# Patient Record
Sex: FEMALE | Race: WHITE | Employment: FULL TIME | URBAN - METROPOLITAN AREA
[De-identification: names, ages, dates, MRNs, and addresses within clinical notes are randomized per-mention and may not be internally consistent; named-entity substitution may affect disease eponyms.]

---

## 2017-08-18 ENCOUNTER — OFFICE VISIT (OUTPATIENT)
Dept: SURGERY | Age: 52
End: 2017-08-18

## 2017-08-18 VITALS
WEIGHT: 190.5 LBS | HEIGHT: 60 IN | HEART RATE: 88 BPM | DIASTOLIC BLOOD PRESSURE: 74 MMHG | OXYGEN SATURATION: 95 % | SYSTOLIC BLOOD PRESSURE: 120 MMHG | RESPIRATION RATE: 20 BRPM | TEMPERATURE: 97.5 F | BODY MASS INDEX: 37.4 KG/M2

## 2017-08-18 DIAGNOSIS — E53.8 VITAMIN B12 DEFICIENCY: ICD-10-CM

## 2017-08-18 DIAGNOSIS — Z98.84 STATUS POST GASTRIC BYPASS FOR OBESITY: ICD-10-CM

## 2017-08-18 DIAGNOSIS — R63.5 WEIGHT GAIN: ICD-10-CM

## 2017-08-18 DIAGNOSIS — E51.9 VITAMIN B1 DEFICIENCY: ICD-10-CM

## 2017-08-18 DIAGNOSIS — E66.01 MORBID OBESITY DUE TO EXCESS CALORIES (HCC): Primary | ICD-10-CM

## 2017-08-18 DIAGNOSIS — E55.9 VITAMIN D DEFICIENCY: ICD-10-CM

## 2017-08-18 DIAGNOSIS — E03.9 ACQUIRED HYPOTHYROIDISM: ICD-10-CM

## 2017-08-18 DIAGNOSIS — K90.9 INTESTINAL MALABSORPTION, UNSPECIFIED TYPE: ICD-10-CM

## 2017-08-18 DIAGNOSIS — D50.9 IRON DEFICIENCY ANEMIA, UNSPECIFIED IRON DEFICIENCY ANEMIA TYPE: ICD-10-CM

## 2017-08-18 RX ORDER — MAGNESIUM 200 MG
1000 TABLET ORAL DAILY
COMMUNITY

## 2017-08-18 RX ORDER — OMEPRAZOLE 40 MG/1
40 CAPSULE, DELAYED RELEASE ORAL
COMMUNITY

## 2017-08-18 NOTE — PATIENT INSTRUCTIONS
Schedule an appointment with the dietician, please call or email   Orin Benavidez RD at:    [phone]  [email or web address]         Upper GI Series: About This Test  What is it? An upper gastrointestinal (GI) series looks at the upper and middle sections of the gastrointestinal tract. The test uses barium contrast material, fluoroscopy, and X-ray. Fluoroscopy is a kind of X-ray. Why is this test done? An upper GI series is done to:  · Find the cause of gastrointestinal symptoms, such as vomiting, burping up food, trouble swallowing, or belly pain. · Find inflamed areas of the intestine. · Find narrow spots (strictures) in the upper intestinal tract or find ulcers, tumors, polyps, or pyloric stenosis. · Find swallowed objects. How can you prepare for the test?  Tell your doctor if:  · You are taking any medicine. · You are allergic to any medicines, barium, or any other X-ray contrast material.  · You are or might be pregnant. This test is not done during pregnancy because of the risk of radiation to the baby (fetus). Your doctor may ask you to do one or all of the following:  · Eat a low-fiber diet for a few days before the test.  · Stop eating for 12 hours before the test.  · Take a laxative to help clean out your intestines the evening before the test.  · Stop taking certain medicines. What happens before the test?  The test is usually done in a clinic or the X-ray department of a hospital.  · You will need to take off your clothes and put on a hospital gown. · Take out any dentures, and take off any jewelry. What happens during the test?  · You will lie on your back on an X-ray table. · You will have an X-ray taken before you drink the barium mix. Then you'll take small swallows repeatedly during the series of X-rays that follow. · The doctor watches the barium pass through your GI tract using fluoroscopy and X-ray pictures.  The table is tilted at different positions, and you may change positions to help spread the barium. What else should you know about the test?  · You may be given a laxative or enema to flush the barium out of your intestines after the test. This prevents constipation. · It's a good idea to drink a lot of fluids for a few days to flush out the barium. · For 1 to 3 days after the test, your stool will look white from the barium. How long does the test take? · The test will take about 30 to 40 minutes. If you are also having a small bowel study, the test will take 2 to 6 hours. In some cases, you may be asked to come back after 24 hours to have more X-rays taken. What happens after the test?  · You will probably be able to go home right away. Results of the test are usually ready in 1 to 3 days. · You can go back to your usual activities right away. You may eat and drink whatever you like, unless your doctor tells you not to. When should you call for help? Watch closely for changes in your health, and be sure to contact your doctor if:  · You aren't able to have a bowel movement in 2 to 3 days after the test.  Follow-up care is a key part of your treatment and safety. Be sure to make and go to all appointments, and call your doctor if you are having problems. It's also a good idea to keep a list of the medicines you take. Ask your doctor when you can expect to have your test results. Where can you learn more? Go to http://vy-rea.info/. Enter C390 in the search box to learn more about \"Upper GI Series: About This Test.\"  Current as of: October 14, 2016  Content Version: 11.3  © 1565-9606 [employer]. Care instructions adapted under license by [employer] (which disclaims liability or warranty for this information). If you have questions about a medical condition or this instruction, always ask your healthcare professional. [address] any warranty or liability for your use of this information.

## 2017-08-18 NOTE — MR AVS SNAPSHOT
Visit Information Date & Time Provider Department Dept. Phone Encounter #  
 8/18/2017 11:20 AM Juan Beavers NP [facility] [phone] [id number] Upcoming Health Maintenance Date Due Hepatitis C Screening 1965 DTaP/Tdap/Td series (1 - Tdap) 10/17/1984 PAP AKA CERVICAL CYTOLOGY 10/17/1984 BREAST CANCER SCRN MAMMOGRAM 10/17/2013 FOBT Q 1 YEAR AGE 50-75 10/17/2013 INFLUENZA AGE 9 TO ADULT 8/1/2017 Allergies as of 8/18/2017  Review Complete On: 8/18/2017 By: Hilario Bryan LPN Severity Noted Reaction Type Reactions Aspirin  01/23/2012    Other (comments) Due to GBP surgery Other Medication  01/23/2012    Other (comments) Steroids, can't take due to Gastric Bypass surgery Current Immunizations  Never Reviewed No immunizations on file. Not reviewed this visit You Were Diagnosed With   
  
 Codes Comments Morbid obesity due to excess calories ([facility]    -  Primary ICD-10-CM: E66.01 
ICD-9-CM: 278.01 Intestinal malabsorption, unspecified type     ICD-10-CM: K90.9 ICD-9-CM: 579.9 Status post gastric bypass for obesity     ICD-10-CM: Z98.84 ICD-9-CM: V45.86 Weight gain     ICD-10-CM: R63.5 ICD-9-CM: 783.1 Vitamin B12 deficiency     ICD-10-CM: E53.8 ICD-9-CM: 266.2 Vitamin D deficiency     ICD-10-CM: E55.9 ICD-9-CM: 268.9 Iron deficiency anemia, unspecified iron deficiency anemia type     ICD-10-CM: D50.9 ICD-9-CM: 280.9 Vitamin B1 deficiency     ICD-10-CM: E51.9 ICD-9-CM: 265.1 Acquired hypothyroidism     ICD-10-CM: E03.9 ICD-9-CM: 244.9 BMI 37.0-37.9, adult     ICD-10-CM: Z68.37 ICD-9-CM: V85.37 Vitals BP Pulse Temp Resp Height(growth percentile) Weight(growth percentile) 120/74 (BP 1 Location: Left arm, BP Patient Position: At rest) 88 97.5 °F (36.4 °C) 20 5' (1.524 m) 190 lb 8 oz (86.4 kg) SpO2 BMI Smoking Status 95% 37.2 kg/m2 Former Smoker Vitals History BMI and BSA Data Body Mass Index Body Surface Area  
 37.2 kg/m 2 1.91 m 2 Preferred Pharmacy Pharmacy Name Phone [id number] [address] Your Updated Medication List  
  
   
This list is accurate as of: 8/18/17 12:23 PM.  Always use your most recent med list.  
  
  
  
  
 CALCIUM + D PO Take  by mouth.  
  
 cyanocobalamin 1,000 mcg/mL injection Commonly known as:  VITAMIN B-12  
1 mL by IntraMUSCular route every thirty (30) days. EFFEXOR  mg capsule Generic drug:  venlafaxine-SR Take  by mouth daily. esomeprazole 40 mg capsule Commonly known as:  NexIUM Take 1 Cap by mouth daily. MULTI-VITAMIN PO Take  by mouth. omeprazole 40 mg capsule Commonly known as:  PRILOSEC Take 40 mg by mouth daily. SYNTHROID 175 mcg tablet Generic drug:  levothyroxine Take 112 mcg by mouth Daily (before breakfast). Syringe with Needle, Safety 3 mL 23 gauge x 1\" Syrg Commonly known as:  3cc Safety Syringe 23Gx1\" 1 Syringe by Does Not Apply route every thirty (30) days. VITAMIN D2 PO Take  by mouth. We Performed the Following CBC W/O DIFF [93795 CPT(R)] IRON PROFILE P390276 CPT(R)] METABOLIC PANEL, COMPREHENSIVE [49674 CPT(R)] PREALBUMIN [90746 CPT(R)] PTH INTACT [16204 CPT(R)] TSH 3RD GENERATION [90490 CPT(R)] VITAMIN B1, WHOLE BLOOD [id number] CPT(R)] VITAMIN B12 & FOLATE [73896 CPT(R)] VITAMIN D, 25 HYDROXY [id number] CPT(R)] To-Do List   
 08/18/2017 Imaging:  XR UPPER GI SERIES W KUB Patient Instructions Schedule an appointment with the dietician, please call or email Yuko Monroy RD at: 
 
[phone] [email or web address] 
 
 
  
Upper GI Series: About This Test 
What is it? An upper gastrointestinal (GI) series looks at the upper and middle sections of the gastrointestinal tract. The test uses barium contrast material, fluoroscopy, and X-ray. Fluoroscopy is a kind of X-ray. Why is this test done? An upper GI series is done to: · Find the cause of gastrointestinal symptoms, such as vomiting, burping up food, trouble swallowing, or belly pain. · Find inflamed areas of the intestine. · Find narrow spots (strictures) in the upper intestinal tract or find ulcers, tumors, polyps, or pyloric stenosis. · Find swallowed objects. How can you prepare for the test? 
Tell your doctor if: 
· You are taking any medicine. · You are allergic to any medicines, barium, or any other X-ray contrast material. 
· You are or might be pregnant. This test is not done during pregnancy because of the risk of radiation to the baby (fetus). Your doctor may ask you to do one or all of the following: 
· Eat a low-fiber diet for a few days before the test. 
· Stop eating for 12 hours before the test. 
· Take a laxative to help clean out your intestines the evening before the test. 
· Stop taking certain medicines. What happens before the test? 
The test is usually done in a clinic or the X-ray department of a hospital. 
· You will need to take off your clothes and put on a hospital gown. · Take out any dentures, and take off any jewelry. What happens during the test? 
· You will lie on your back on an X-ray table. · You will have an X-ray taken before you drink the barium mix. Then you'll take small swallows repeatedly during the series of X-rays that follow. · The doctor watches the barium pass through your GI tract using fluoroscopy and X-ray pictures. The table is tilted at different positions, and you may change positions to help spread the barium.  
What else should you know about the test? 
· You may be given a laxative or enema to flush the barium out of your intestines after the test. This prevents constipation. · It's a good idea to drink a lot of fluids for a few days to flush out the barium. · For 1 to 3 days after the test, your stool will look white from the barium. How long does the test take? · The test will take about 30 to 40 minutes. If you are also having a small bowel study, the test will take 2 to 6 hours. In some cases, you may be asked to come back after 24 hours to have more X-rays taken. What happens after the test? 
· You will probably be able to go home right away. Results of the test are usually ready in 1 to 3 days. · You can go back to your usual activities right away. You may eat and drink whatever you like, unless your doctor tells you not to. When should you call for help? Watch closely for changes in your health, and be sure to contact your doctor if: 
· You aren't able to have a bowel movement in 2 to 3 days after the test. 
Follow-up care is a key part of your treatment and safety. Be sure to make and go to all appointments, and call your doctor if you are having problems. It's also a good idea to keep a list of the medicines you take. Ask your doctor when you can expect to have your test results. Where can you learn more? Go to http://vy-rea.info/. Enter J416 in the search box to learn more about \"Upper GI Series: About This Test.\" Current as of: October 14, 2016 Content Version: 11.3 © 1234-2358 [employer]. Care instructions adapted under license by [employer] (which disclaims liability or warranty for this information). If you have questions about a medical condition or this instruction, always ask your healthcare professional. [place] [address] any warranty or liability for your use of this information. Introducing [address]!    
 César Frausto introduces [employer] patient portal. Now you can access parts of your medical record, email your doctor's office, and request medication refills online. 1. In your internet browser, go to [email or web address] 2. Click on the First Time User? Click Here link in the Sign In box. You will see the New Member Sign Up page. 3. Enter your [employer]le Access Code exactly as it appears below. You will not need to use this code after youve completed the sign-up process. If you do not sign up before the expiration date, you must request a new code. · Perkle Access Code: [email or web address] Expires: 11/16/2017 12:23 PM 
 
4. Enter the last four digits of your Social Security Number (xxxx) and Date of Birth (mm/dd/yyyy) as indicated and click Submit. You will be taken to the next sign-up page. 5. Create a Perkle ID. This will be your Perkle login ID and cannot be changed, so think of one that is secure and easy to remember. 6. Create a [employer]kle password. You can change your password at any time. 7. Enter your Password Reset Question and Answer. This can be used at a later time if you forget your password. 8. Enter your e-mail address. You will receive e-mail notification when new information is available in [address] 9. Click Sign Up. You can now view and download portions of your medical record. 10. Click the Download Summary menu link to download a portable copy of your medical information. If you have questions, please visit the Frequently Asked Questions section of the [employer] website. Remember, [employer] is NOT to be used for urgent needs. For medical emergencies, dial 911. Now available from your iPhone and Android! Please provide this summary of care documentation to your next provider. Your primary care clinician is listed as Phys Other. If you have any questions after today's visit, please call [phone].

## 2017-08-18 NOTE — PROGRESS NOTES
1. Have you been to the ER, urgent care clinic since your last visit? Hospitalized since your last visit? yes 3 months ago to ED for spider bite    2. Have you seen or consulted any other health care providers outside of the [facility] since your last visit? Include any pap smears or colon screening.  No

## 2017-08-18 NOTE — PROGRESS NOTES
Pabol Montoya is a 46 y.o. female 15 years 2 months s/p open gastric bypass down 70.5 pounds. Weight today is 190.5 pounds. Patient has gained 24 pounds since seen in 2012. Patient comes in office concern for weight gain. Stated she has gained the 20+ pounds in the last 2.5 months. \" I am concern. \" Denies nausea, no vomiting, occasional heartburn/reflux, currently on Omeprazole. Denies dysphagia. No fever/no chills, no shortness of breath, no chest pain, and no abdominal pain. Tolerating all foods. Diet recall: Breakfast is usually oatmeal, lunch is salad, and dinner is often skipped due to work schedule, getting home late. Snacking occasional with vegetables, chips, or crackers. Drinking  at least 40 ounces of water daily. Has cut back on caffeine intake. No soda drinking. Denies eating/drinking together. Tolerating all vitamins and medications. Patient asking to have thyroid levels checked. Levels haven't been checked in a while. Exercising with just starting to go back to gym. No issues with urination or bowel habits. Stated quit smoking in October 2016    HPI    Review of Systems   Constitutional: Negative for chills, fever and malaise/fatigue. Respiratory: Negative for cough, sputum production and shortness of breath. Cardiovascular: Negative for chest pain, palpitations and leg swelling. Gastrointestinal: Positive for heartburn. Negative for abdominal pain, blood in stool, constipation, diarrhea, nausea and vomiting. Genitourinary: Negative for dysuria. Neurological: Negative for dizziness and headaches. Physical Exam   Constitutional: She is oriented to person, place, and time. She appears well-developed and well-nourished. No distress. Cardiovascular: Normal rate, regular rhythm and normal heart sounds. Pulmonary/Chest: Effort normal and breath sounds normal. No respiratory distress. She has no wheezes. She has no rales. Abdominal: Soft.  Bowel sounds are normal. She exhibits no distension. There is no tenderness. There is no rebound and no guarding. Previous surgical incision dry and intact. No hernia/masses palpated   Musculoskeletal: Normal range of motion. She exhibits no edema. Neurological: She is alert and oriented to person, place, and time. Skin: Skin is warm and dry. No rash noted. No erythema. Psychiatric: She has a normal mood and affect. Her behavior is normal. Thought content normal.   Blood pressure 120/74, pulse 88, temperature 97.5 °F (36.4 °C), resp. rate 20, height 5' (1.524 m), weight 190 lb 8 oz (86.4 kg), SpO2 95 %. ASSESSMENT and PLAN  Morbid Obesity 13 years 2 months s/p open gastric bypass down 70.5 pounds. Weight today is 190.5 pounds. Weight gain    Patient to be scheduled for Upper GI  to evaluate gastric bypass anatomy for any pouch dilatation,fistula, etc. Advised patient regard to diet that is high-protein, low-fat, low-sugar, limited carbohydrates. Patient to increase protein intake, discuss use of protein supplementation. Strive for 60 grams of protein daily. If having a snack, foods that are protein or fiber rich. Provided patient with updated copy of bariatric educational booklet. Provided information for nutritionist. Still pay attention to behavioral factor and habits. No eating/drinking together, chew foods well, and portion control. Drink at least 40 ounces of non-carbonated, non-calorie beverages daily. Continue bariatric vitamin regiment. Provided patient with routine labs slip. Also will check TSH level. Advised anything concerning with labs, will contact patient prior to next visit. Patient to follow up in office status post Upper GI. Patient verbalized understanding and questions were answered to the best of my knowledge and ability.  Advised to call office if any questions/concerns.     43 minutes was spent with patient, greater than 50% of time spent counseling

## 2017-08-22 LAB
25(OH)D3+25(OH)D2 SERPL-MCNC: 21.2 NG/ML (ref 30–100)
ALBUMIN SERPL-MCNC: 4.4 G/DL (ref 3.5–5.5)
ALBUMIN/GLOB SERPL: 1.6 {RATIO} (ref 1.2–2.2)
ALP SERPL-CCNC: 68 IU/L (ref 39–117)
ALT SERPL-CCNC: 13 IU/L (ref 0–32)
AST SERPL-CCNC: 18 IU/L (ref 0–40)
BILIRUB SERPL-MCNC: 0.3 MG/DL (ref 0–1.2)
BUN SERPL-MCNC: 8 MG/DL (ref 6–24)
BUN/CREAT SERPL: 12 (ref 9–23)
CALCIUM SERPL-MCNC: 9.2 MG/DL (ref 8.7–10.2)
CHLORIDE SERPL-SCNC: 101 MMOL/L (ref 96–106)
CO2 SERPL-SCNC: 25 MMOL/L (ref 18–29)
CREAT SERPL-MCNC: 0.65 MG/DL (ref 0.57–1)
ERYTHROCYTE [DISTWIDTH] IN BLOOD BY AUTOMATED COUNT: 14.1 % (ref 12.3–15.4)
FOLATE SERPL-MCNC: 11.7 NG/ML
GLOBULIN SER CALC-MCNC: 2.7 G/DL (ref 1.5–4.5)
GLUCOSE SERPL-MCNC: 86 MG/DL (ref 65–99)
HCT VFR BLD AUTO: 39.4 % (ref 34–46.6)
HGB BLD-MCNC: 12.8 G/DL (ref 11.1–15.9)
IRON SATN MFR SERPL: 16 % (ref 15–55)
IRON SERPL-MCNC: 71 UG/DL (ref 27–159)
MCH RBC QN AUTO: 28.3 PG (ref 26.6–33)
MCHC RBC AUTO-ENTMCNC: 32.5 G/DL (ref 31.5–35.7)
MCV RBC AUTO: 87 FL (ref 79–97)
PLATELET # BLD AUTO: 283 X10E3/UL (ref 150–379)
POTASSIUM SERPL-SCNC: 4 MMOL/L (ref 3.5–5.2)
PREALB SERPL-MCNC: 23 MG/DL (ref 10–36)
PROT SERPL-MCNC: 7.1 G/DL (ref 6–8.5)
PTH-INTACT SERPL-MCNC: 45 PG/ML (ref 15–65)
RBC # BLD AUTO: 4.52 X10E6/UL (ref 3.77–5.28)
SODIUM SERPL-SCNC: 141 MMOL/L (ref 134–144)
TIBC SERPL-MCNC: 442 UG/DL (ref 250–450)
TSH SERPL DL<=0.005 MIU/L-ACNC: 1.95 UIU/ML (ref 0.45–4.5)
UIBC SERPL-MCNC: 371 UG/DL (ref 131–425)
VIT B1 BLD-SCNC: 112.6 NMOL/L (ref 66.5–200)
VIT B12 SERPL-MCNC: 658 PG/ML (ref 211–946)
WBC # BLD AUTO: 6.9 X10E3/UL (ref 3.4–10.8)

## 2017-08-28 ENCOUNTER — HOSPITAL ENCOUNTER (OUTPATIENT)
Dept: GENERAL RADIOLOGY | Age: 52
Discharge: HOME | End: 2017-08-28
Attending: NURSE PRACTITIONER
Payer: COMMERCIAL

## 2017-08-28 DIAGNOSIS — E51.9 VITAMIN B1 DEFICIENCY: ICD-10-CM

## 2017-08-28 DIAGNOSIS — D50.9 IRON DEFICIENCY ANEMIA, UNSPECIFIED IRON DEFICIENCY ANEMIA TYPE: ICD-10-CM

## 2017-08-28 DIAGNOSIS — R63.5 WEIGHT GAIN: ICD-10-CM

## 2017-08-28 DIAGNOSIS — K90.9 INTESTINAL MALABSORPTION, UNSPECIFIED TYPE: ICD-10-CM

## 2017-08-28 DIAGNOSIS — E03.9 ACQUIRED HYPOTHYROIDISM: ICD-10-CM

## 2017-08-28 DIAGNOSIS — E55.9 VITAMIN D DEFICIENCY: ICD-10-CM

## 2017-08-28 DIAGNOSIS — Z98.84 STATUS POST GASTRIC BYPASS FOR OBESITY: ICD-10-CM

## 2017-08-28 DIAGNOSIS — E53.8 VITAMIN B12 DEFICIENCY: ICD-10-CM

## 2017-08-28 DIAGNOSIS — E66.01 MORBID OBESITY DUE TO EXCESS CALORIES (HCC): ICD-10-CM

## 2017-08-28 PROCEDURE — 74241 XR UPPER GI SERIES W KUB: CPT

## 2017-09-13 NOTE — PROGRESS NOTES
Discussed with patient by phone.  Patient to have follow up appointment with Dr. Rowena Galvin to discuss surgical options

## 2017-09-22 ENCOUNTER — OFFICE VISIT (OUTPATIENT)
Dept: SURGERY | Age: 52
End: 2017-09-22

## 2017-09-22 VITALS
BODY MASS INDEX: 37.3 KG/M2 | DIASTOLIC BLOOD PRESSURE: 70 MMHG | SYSTOLIC BLOOD PRESSURE: 118 MMHG | OXYGEN SATURATION: 98 % | HEIGHT: 60 IN | WEIGHT: 190 LBS | RESPIRATION RATE: 20 BRPM

## 2017-09-22 RX ORDER — CONJUGATED ESTROGENS AND MEDROXYPROGESTERONE ACETATE .625; 2.5 MG/1; MG/1
1 TABLET, SUGAR COATED ORAL DAILY
COMMUNITY
Start: 2017-09-10

## 2017-09-22 NOTE — PROGRESS NOTES
1. Have you been to the ER, urgent care clinic since your last visit? Hospitalized since your last visit? No    2. Have you seen or consulted any other health care providers outside of the [address] [place] [address] since your last visit? Include any pap smears or colon screening.  No

## 2017-09-22 NOTE — MR AVS SNAPSHOT
Visit Information Date & Time Provider Department Dept. Phone Encounter #  
 9/22/2017  9:30 AM Nate Iverson MD [address] [phone] [id number] Upcoming Health Maintenance Date Due Hepatitis C Screening 1965 DTaP/Tdap/Td series (1 - Tdap) 10/17/1984 PAP AKA CERVICAL CYTOLOGY 10/17/1984 BREAST CANCER SCRN MAMMOGRAM 10/17/2013 FOBT Q 1 YEAR AGE 50-75 10/17/2013 INFLUENZA AGE 9 TO ADULT 8/1/2017 Allergies as of 9/22/2017  Review Complete On: 9/22/2017 By: Russel Blackwood Severity Noted Reaction Type Reactions Aspirin  01/23/2012    Other (comments) Due to GBP surgery Other Medication  01/23/2012    Other (comments) Steroids, can't take due to Gastric Bypass surgery Current Immunizations  Never Reviewed No immunizations on file. Not reviewed this visit Vitals BP Resp Height(growth percentile) Weight(growth percentile) SpO2 BMI  
 118/70 (BP 1 Location: Left arm, BP Patient Position: Sitting) 20 5' (1.524 m) 190 lb (86.2 kg) 98% 37.11 kg/m2 Smoking Status Former Smoker Vitals History BMI and BSA Data Body Mass Index Body Surface Area  
 37.11 kg/m 2 1.91 m 2 Preferred Pharmacy Pharmacy Name Phone [id number] Your Updated Medication List  
  
   
This list is accurate as of: 9/22/17 10:25 AM.  Always use your most recent med list.  
  
  
  
  
 CALCIUM + D PO Take  by mouth. EFFEXOR  mg capsule Generic drug:  venlafaxine-SR Take  by mouth daily. MULTI-VITAMIN PO Take  by mouth. omeprazole 40 mg capsule Commonly known as:  PRILOSEC Take 40 mg by mouth daily. PREMPRO 0.625-2.5 mg per tablet Generic drug:  estrogen (conjugated)-medroxyPROGESTERone SYNTHROID 175 mcg tablet Generic drug:  levothyroxine Take 112 mcg by mouth Daily (before breakfast). VITAMIN B-12 1,000 mcg sublingual tablet Generic drug:  cyanocobalamin Take 1,000 mcg by mouth daily. VITAMIN D2 PO Take  by mouth. Introducing [facility] [place] [facility]! Dear Thelma Keenan: Thank you for requesting a [employer] account. Our records indicate that you already have an active [employer] account. You can access your account anytime at [email or web address] Did you know that you can access your hospital and ER discharge instructions at any time in [employer]? You can also review all of your test results from your hospital stay or ER visit. Additional Information If you have questions, please visit the Frequently Asked Questions section of the [employer] website at [email or web address] Remember, [employer] is NOT to be used for urgent needs. For medical emergencies, dial 911. Now available from your iPhone and Android! Please provide this summary of care documentation to your next provider. Your primary care clinician is listed as Phys Other. If you have any questions after today's visit, please call [phone].

## 2017-09-22 NOTE — PROGRESS NOTES
Initial Consultation for possible revision of previous Bariatric Surgery     Jessica Johns is a 46 y.o. female who comes into the office today for initial consultation for the surgical options for the treatment of morbid obesity and complications of previous bariatric surgery. She has chronic obesity unresponsive to numerous treatment strategies. Jessica Johns has tried a variety of weight-loss attempts including a previous open divided banded gastric bypass in 2002 by Dr. Sheela Molina, but has had weight regain over recent years, notably related to increased hunger. Her pre-surgery weight was 290  lbs. and her lowest postoperative body weight was aproximately 145 lbs. She has regained aproximately 30 lbs over one year(s). She does receive feedback from the previous surgery, including limited capacity for eating. Patient reports they can eat 6 inch size of submarine sandwich at one meal sititng but must remove the bread. There is some grazing behavior with return to eating more within 1-2 hours. The patient does experience dumping,  and avoids sugar intake. Patient also notes increased hunger to drive food intake. Jessica Johns has significant health problems due to severe obesity  Notably she has daily GERD including regurgitation of foul tasting liquid, unable to sleep due to regurgitation at night, sleeps on a wedge- Sx despite prilosec and OTC antacids. She has never seen the fluid so does not know if bile or acid. Today she is Height: 5' (152.4 cm) , Weight: 190 lb (86.2 kg) for a BMI of Body mass index is 37.11 kg/(m^2). [facility] 9/22/2017 8/18/2017 1/23/2012 10/2/2010   Today's Wt 190 lb 190 lb 8 oz 166 lb 8 oz 166 lb   BMI 37.11 kg/m2 37.2 kg/m2 32.52 kg/m2 32.42 kg/m2       Body mass index is 37.11 kg/(m^2).         Past Medical History:   Diagnosis Date    Anemia     Chronic low back pain 10/2/2010    Dyspepsia and other specified disorders of function of stomach  Gigantomastia 10/2/2010    Incontinence of urine 10/2/2010    Morbid obesity ([address] 10/2/2010    Musculoskeletal disorder     Pain, upper back 10/2/2010    Psychosocial circumstance 10/2/2010    Right knee pain 10/2/2010    Shoulder pain 10/2/2010    Sleep apnea 10/2/2010    SOB (shortness of breath) 10/2/2010    Status post gastric bypass for obesity 10/2/2010       Past Surgical History:   Procedure Laterality Date    GASTRIC BYPASS,OBESE<150CM AUREA-EN-Y  6/7/02    decompression of 5by 7cm chocolate cyst     HX ABDOMINOPLASTY  04    HX BREAST REDUCTION      HX CHOLECYSTECTOMY  6/7/02        HX CYST REMOVAL  6/20/91    left wrist     HX OTHER SURGICAL  05    thigh lift    HX TUBAL LIGATION  1995    Dr.Kirby TIFFANIE BOURGEOIS    IR IVC FILTER  6/7/02        VAGOTOMY/PYLOROPLASTY,HI SELECT  6/7/02           Current Outpatient Prescriptions   Medication Sig Dispense Refill    PREMPRO 0.625-2.5 mg per tablet       omeprazole (PRILOSEC) 40 mg capsule Take 40 mg by mouth daily.  cyanocobalamin (VITAMIN B-12) 1,000 mcg sublingual tablet Take 1,000 mcg by mouth daily.  CALCIUM CARBONATE/VITAMIN D3 (CALCIUM + D PO) Take  by mouth.  levothyroxine (SYNTHROID) 175 mcg tablet Take 112 mcg by mouth Daily (before breakfast).  MULTI-VITAMIN PO Take  by mouth.  ERGOCALCIFEROL, VITAMIN D2, (VITAMIN D PO) Take  by mouth.  venlafaxine-SR (EFFEXOR XR) 150 mg capsule Take  by mouth daily.          Allergies   Allergen Reactions    Aspirin Other (comments)     Due to GBP surgery    Other Medication Other (comments)     Steroids, can't take due to Gastric Bypass surgery       Social History   Substance Use Topics    Smoking status: Former Smoker    Smokeless tobacco: Never Used    Alcohol use Yes      Comment: rare       Family History   Problem Relation Age of Onset    Cancer Mother     Heart defect Mother      irregular heat beat    Heart Disease Mother     Arthritis-rheumatoid Father     Hypertension Father     Diabetes Father     Heart Disease Father     Diabetes Sister     Heart Disease Sister     Diabetes Brother     Heart Disease Brother     Stroke Paternal Grandmother        ROS, positive where in bold:    General: fevers, chills, night sweats, fatigue, weight loss, weight gain. GI: abdominal pain, nausea, vomiting, change in bowel habits, hematochezia, melena, or - GERD. Integumentary: dermatitis or abnormal moles. HEENT:  visual changes, vertigo, epistaxis, dysphagia, hoarseness    Cardiac: chest pain, palpitations, hypertension, edema,  varicosities    Resp:  cough, shortness of breath, wheezing, hemoptysis, snoring,  reactive airway disease    : hematuria, dysuria, frequency, urgency, nocturia, stress urinary incontinence    MSK: weakness, joint pain,  arthritis    Endocrine: diabetes, thyroid disease, polyuria, polydipsia, polyphagia, poor wound healing, heat intolerance,cold intolerance. Lymph/Heme: anemia, bruising,  history of blood transfusions. Neuro: dizziness, headache, fainting, seizures, stroke. Psychiatry:  Anxiety, depression, psychosis      Physical Exam:  Visit Vitals    /70 (BP 1 Location: Left arm, BP Patient Position: Sitting)    Resp 20    Ht 5' (1.524 m)    Wt 190 lb (86.2 kg)    SpO2 98%    BMI 37.11 kg/m2       General: Well developed, obese 46 y.o. female in no acute distress  ENMT: normocephalic, atraumatic   Respiratory: excursions normal and symmetrical  Cardiovascular: Regular rate and rhythm  Abdomen:  No obvious hernia  Musculoskeletal: normal gait/station  Neuro: symmetrical  Psych: alert and oriented to person, place and time            Report suggests possible staple line  Disruption into excluded stomach- will eval by EGD and have asked  For Dr Alma Martines to review the UGI images- ?  Is this actually the elongated Mark that was used to plicate the pouch staple line?  Very large gastric pouch volume seen      Impression:    Cheryl Boss is a 46 y.o. female who is suffering from weight regain and significant GERD sx. The patient also has morbid obesity with a BMI of Body mass index is 37.11 kg/(m^2). and comorbidities who could benefit from weight loss significantly. She is struggling after previous ring banded gastric bypass. She has had an UGI series which shows an enlarged gastric pouch and no evidence of constriction by a ring/band- therefore one concern is band erosion. In addition, the radiology report suggests contrast in the excluded portion of the stomach which I do not see- I believe this is contrast in the very long blind afferent limb of Mark as per Dr Lillie Cui techniques. I have asked Dr Perez Nicely to review these images and he agrees with my interpretation that there is not contrast seen on these captured images regarding contrast in the excluded stomach compartment. Nonetheless this is a very large gastric pouch with no obvious ring present suggesting erosion or failure of the ring and providing an explanation for substantial weight regain. GERD is a very significant problem for her and can be greatly improved by surgical revision in my opinion. I believe she may be best managed by an EGD to assess the previous gastric bypass anatomy with particular attention to  Possible band erosion which would require removal perhaps endoscopically at the time of EGD. Will arrange for Dr Austin Nava to perform. I would also like her to see the RD re behavioral evaluation and treatment for weight regain. We will discuss the results of the above evaluation with the patient when testing is completed. We have already discussed that revisional bariatric surgery is a high risk undertaking with complication rates 3 to 5 fold higher than the primary procedure.     More than 50% of this encounter was spent in direct counseling for this patient Counseling included topics such as the ongoing evaluation and treatment as well as options for future care. All questions have been answered in detail and the patient has expressed satisfaction regarding understanding of all this information. At least 45 minutes were spent in direct patient contact including more than 50% of the time spent directly counseling the patient as above during this encounter.

## 2017-09-27 ENCOUNTER — TELEPHONE (OUTPATIENT)
Dept: SURGERY | Age: 52
End: 2017-09-27

## 2017-09-27 NOTE — TELEPHONE ENCOUNTER
Sweta Sanders wanted to be sure that the referral was correct for the patient to see the gastro phys from a note that was written by [facility].

## 2017-10-10 ENCOUNTER — CLINICAL SUPPORT (OUTPATIENT)
Dept: SURGERY | Age: 52
End: 2017-10-10

## 2017-10-10 VITALS — BODY MASS INDEX: 37.89 KG/M2 | WEIGHT: 194 LBS

## 2017-10-10 NOTE — PROGRESS NOTES
Pre-operative Bariatric Nutrition Evaluation    Date: 10/10/2017   Kay Hernandez M.D. Name: Wendy Ye  :  1965  Age:  46  Gender: Female   Type of Surgery: [x]           Gastric Bypass  []           LAGB  []           Sleeve Gastrectomy    ASSESSMENT:    Past Medical History:thyroid condition, pre-diabetes, GERD, depression, anxiety, arthritis; h/o gastric bypass in      Medications/Supplements:   Prior to Admission medications    Medication Sig Start Date End Date Taking? Authorizing Provider   PREMPRO 0.625-2.5 mg per tablet  9/10/17   Historical Provider   omeprazole (PRILOSEC) 40 mg capsule Take 40 mg by mouth daily. Historical Provider   cyanocobalamin (VITAMIN B-12) 1,000 mcg sublingual tablet Take 1,000 mcg by mouth daily. Historical Provider   CALCIUM CARBONATE/VITAMIN D3 (CALCIUM + D PO) Take  by mouth. Historical Provider   levothyroxine (SYNTHROID) 175 mcg tablet Take 112 mcg by mouth Daily (before breakfast). Historical Provider   venlafaxine-SR (EFFEXOR XR) 150 mg capsule Take  by mouth daily. Historical Provider   MULTI-VITAMIN PO Take  by mouth. Historical Provider   ERGOCALCIFEROL, VITAMIN D2, (VITAMIN D PO) Take  by mouth. Historical Provider       Food Allergies/Intolerances:some intolerance to meats    Anthropometrics:    Ht:60\"    Wt: 194#    IBW: 100#    %IBW: 194%     BMI:37    Category: obesity II     Reported wt history:Pt presents today for pre-op nutrition evaluation considering a possible revision to previous gastric bypass in . Reports losing from 293# down to 143# in the first year and then maintained around 165-170# for many years. Wt gain has become an issue in the past 3-6 months and appears to be impacted by a number of factors including menopause, increased stress with several family members passing, change in job, more sedentary job and health conditions (thyroid).  From a diet/lifestyle stand point pt is skipping meals d/t work schedule and timing of meals before lying down for bed. She still feels restriction with small portions, never eating more than 1/2 cup per meal. Does have some food intolerance, especially with certain meats. Avoids carbonated beverages d/t intolerance. Tries to stop eating 2-3 hours before going to bed d/t acid reflux. Goes to bed at 8:30 d/t early work day. Intentional exercise also seems to be lacking d/t sedentary job and lack of time for exercise. Pt appears to be motivated to do what she needs to do to lose the weight. Appears to be insightful into the fact that multiple life events and changes have likely impacted her weight. Is considering having a revision surgery and has completed some of the testing required to determine next steps. If moving forward, she may need to complete 6 months supervised wt loss for insurance. Exercise/Physical Activity:uses an foot pedal/exercise bike under her desk at work; otherwise lacks intentional/routine exercise    24 Hour Diet Recall  Breakfast  1/2 banana or yogurt    Lunch  Salad or soup - doesn't measure but can only take about 4 bites and then full    Dinner  Usually skips d/t getting home late and not wanting to eat 2 hours before bed    Snacks  Chips or crackers if feeling \"queasy\"    Beverages  Water, 1 cup coffee or tea      A pre-op nutrition checklist was reviewed. Patient checked off 8 of 15 items. Environment/Psychosocial/Support:shona and daughters are primary support system     NUTRITION DIAGNOSIS:  1. Self-monitoring deficit r/t previous lack of value for this change evidenced by pt skips meals d/t work schedule and timing of stopping eating before bed d/t GERD. 2. Physical inactivity r/t perception that lack of time prevents exercise evidenced by pt with no intentional exercise regimen/routine.  We discussed exercise as a means to help with stress management and better sleep as these are additional lifestyle factors that have impacted her weight. NUTRITION INTERVENTION:  Pt educated on nutrition recommendations for weight loss surgery, specifically gastric bypass. Instructed on consuming 3 meals per day starting now. Use the balanced plate method to plan meals, include 3 oz of lean source of protein, 1/2 cup whole grains, unlimited non-starchy vegetables, 1/2 cup fruit and 1 serving of low fat dairy. Utilize handouts listing healthy snack and meal ideas to limit restaurant meals. After surgery measure all meals to 1/2 cup. Each meal will contain a 1/4 cup lean protein and 1/4 cup fruit, non-starchy vegetable or starch (limiting to once per day). Aim for 60 g protein per day. Sip on 48-64 oz of sugar free, calorie free, non-carbonated beverages each day. Do not use a straw. Do not consume beverages 30 minutes before, during or 30 minutes after meals. Read all nutrition labels. Demonstrated and emphasized identifying serving size, total fat, sugar and protein content. Defined low fat as </= 3 g per serving. Discussed lean and extra lean sources of protein. Provided list of low fat cooking methods. Avoid foods with sugar listed in the first 3 ingredients and >/15 g sugar per serving. Excess sugar/fat intake may lead to dumping syndrome. Discussed signs and symptoms of dumping syndrome. Practice mindful eating habits; take small bites, chew thoroughly, avoid distractions, utilize hunger/fullness scale. Consume meals over 20-30 minutes. Attend Bariatric Support Group and increase physical activity (approved per MD) for long term weight maintenance. NUTRITION MONITORING AND EVALUATION:    The following goals were established with patient;  1. Eat 3 meals a day. Do not skip dinner. We discussed packing dinner to take to work to eat before leaving work. That way she will still be within the time frame for going to bed.  Can use a protein shake in place of skipping the meal. Several different food sources of protein for dinner and recipes were provided. 2. Increase physical activity to help promote wt loss, stress management and better sleep. We discussed consideration of 10-15 minute intervals of walking spaced throughout the day. Eventually work up to 150 minutes of walking per week. 3. Focus on protein at each meal. Fruits and veggies and limit starchy foods. 4. Drink mostly water and other calorie-free, non-carbonated fluids. 5. Follow up with RD PRN. Specific tips and techniques to facilitate compliance with above recommendations were provided and discussed. Pt was strongly encourage to begin making necessary changes now, attend support group, and re-visit the dietitian prn. Nutrition evaluation reveals lifestyle changes are indicated to better comply with nutrition guidelines and promote desired wt loss. Goals set and recommendations made. Will continue to follow up next month to re-evaluate. If further details are desired please feel free to contact me at [phone]. This phone number was also provided to the patient for any further questions or concerns.            Chris Millan RD

## 2017-10-17 ENCOUNTER — TELEPHONE (OUTPATIENT)
Dept: SURGERY | Age: 52
End: 2017-10-17

## 2017-11-07 ENCOUNTER — CLINICAL SUPPORT (OUTPATIENT)
Dept: SURGERY | Age: 52
End: 2017-11-07

## 2017-11-07 VITALS — BODY MASS INDEX: 37.69 KG/M2 | WEIGHT: 193 LBS

## 2017-11-07 DIAGNOSIS — E66.9 OBESITY (BMI 30-39.9): Primary | ICD-10-CM

## 2017-11-07 NOTE — PROGRESS NOTES
Pre-operative Bariatric Nutrition Evaluation - Follow Up     Date: 2017   Willi Lua M.D. Name: Stephany Hassan  :  1965  Age:  45  Gender: Female   Type of Surgery: [x]           Gastric Bypass  []           LAGB  []           Sleeve Gastrectomy    ASSESSMENT:    Pt presents today for follow up nutrition counseling. Pt with previous gastric bypass in . Pt is considering possible revision surgery. See initial nutrition evaluation from 10/10/17 for detailed wt history. At initial consult, lifestyle changes were indicated to better promote wt loss and prepare for potential revision surgery including working on eating 3 meals a day and more physical activity/intentional exercise. Pt presents today and reports she did work on eating 3 meals a day and trying not to skip dinner. Has worked on this change but admits to not being consistent on a daily basis. Is still somewhat \"fearful\" of eating more causing wt gain. We discussed the importance of eating 3 meals a day as a means to get adequate protein intake to help promote wt loss rather than cause wt gain. We also discussed that if she is to have a revision surgery, eating 3 meals a day for adequate protein is imperative. Pt also reports that she has started walking more during lunch break and going to the gym a few times per week. Admits that activity level is \"not what I'd like it to be\" but has increased from last month. Overall she has made some small changes since our first session and demonstrates motivation for continued changes. Pt has endoscopy scheduled for 17.      Anthropometrics:    Ht:60\"   Today's Wt: 193#  Wt at previous visit (10/10/17): 194#  Net change: 1# wt loss       BMI:37    Category: obesity II     Exercise/Physical Activity:walking 3-4 times per week for 15 minutes during lunch break, gym 2-3 times per week for 30 minutes     24 Hour Diet Recall  Breakfast  1/2 banana or yogurt   Lunch  Salad or soup- states she takes about 4 bites and \"full\"    Dinner  Protein shake    Snacks  Chips or crackers    Beverages  Water      A pre-op nutrition checklist was reviewed. Patient checked off 8 of 15 items. NUTRITION DIAGNOSIS:  1. Self-monitoring deficit r/t previous lack of value for this change evidenced by pt still skipping meals, however, has worked on improved eating patterns of 3 meals a day. 2. Physical inactivity r/t busy schedule evidenced by increased physical activity but still not frequent enough to promote wt loss. Pt identifies the desire to do more exercise. NUTRITION INTERVENTION:  Pt educated on nutrition recommendations for weight loss surgery, specifically gastric bypass. Instructed on consuming 3 meals per day. Do not skip meals. Okay to use a protein shake in place of skipping the meal all together. However, choosing solid textured foods is preferred as these foods provide longer satiety which can help promote wt loss. Discussed the importance of getting 60 grams protein per day from eating 3 meals a day to promote wt loss. Continue to increase exercise as tolerated. Eventually working up to 150 minutes per week. NUTRITION MONITORING AND EVALUATION:    The following goals were established with patient;  1. Continue to work on eating 3 meals a day to achieve minimum 60 grams protein per day. 2. Continue to increase exercise. 3. Follow up with RD in one month. Specific tips and techniques to facilitate compliance with above recommendations were provided and discussed. Pt was strongly encourage to begin making necessary changes now, attend support group, and re-visit the dietitian prn. Will continue to assess pt as she works to complete nutrition follow up sessions. If further details are desired please feel free to contact me at [phone]. This phone number was also provided to the patient for any further questions or concerns.            Tad Mcclellan, RD

## 2017-11-27 ENCOUNTER — HOSPITAL ENCOUNTER (OUTPATIENT)
Age: 52
Setting detail: OUTPATIENT SURGERY
Discharge: HOME | End: 2017-11-27
Attending: INTERNAL MEDICINE | Admitting: INTERNAL MEDICINE
Payer: COMMERCIAL

## 2017-11-27 ENCOUNTER — ANESTHESIA (OUTPATIENT)
Dept: ENDOSCOPY | Age: 52
End: 2017-11-27
Payer: COMMERCIAL

## 2017-11-27 ENCOUNTER — ANESTHESIA EVENT (OUTPATIENT)
Dept: ENDOSCOPY | Age: 52
End: 2017-11-27
Payer: COMMERCIAL

## 2017-11-27 VITALS
TEMPERATURE: 98.3 F | HEART RATE: 79 BPM | WEIGHT: 193.56 LBS | HEIGHT: 59 IN | RESPIRATION RATE: 19 BRPM | DIASTOLIC BLOOD PRESSURE: 81 MMHG | OXYGEN SATURATION: 95 % | BODY MASS INDEX: 39.02 KG/M2 | SYSTOLIC BLOOD PRESSURE: 134 MMHG

## 2017-11-27 PROCEDURE — 74011250636 HC RX REV CODE- 250/636

## 2017-11-27 PROCEDURE — 74011250636 HC RX REV CODE- 250/636: Performed by: INTERNAL MEDICINE

## 2017-11-27 PROCEDURE — 76060000031 HC ANESTHESIA FIRST 0.5 HR: Performed by: INTERNAL MEDICINE

## 2017-11-27 PROCEDURE — 76040000019: Performed by: INTERNAL MEDICINE

## 2017-11-27 PROCEDURE — 74011000250 HC RX REV CODE- 250

## 2017-11-27 RX ORDER — ATROPINE SULFATE 0.1 MG/ML
0.5 INJECTION INTRAVENOUS
Status: ACTIVE | OUTPATIENT
Start: 2017-11-27 | End: 2017-11-27

## 2017-11-27 RX ORDER — LIDOCAINE HYDROCHLORIDE 20 MG/ML
INJECTION, SOLUTION EPIDURAL; INFILTRATION; INTRACAUDAL; PERINEURAL AS NEEDED
Status: DISCONTINUED | OUTPATIENT
Start: 2017-11-27 | End: 2017-11-27 | Stop reason: HOSPADM

## 2017-11-27 RX ORDER — SODIUM CHLORIDE 0.9 % (FLUSH) 0.9 %
5-10 SYRINGE (ML) INJECTION AS NEEDED
Status: ACTIVE | OUTPATIENT
Start: 2017-11-27 | End: 2017-11-27

## 2017-11-27 RX ORDER — DIPHENHYDRAMINE HYDROCHLORIDE 50 MG/ML
50 INJECTION, SOLUTION INTRAMUSCULAR; INTRAVENOUS ONCE
Status: DISCONTINUED | OUTPATIENT
Start: 2017-11-27 | End: 2017-11-27 | Stop reason: HOSPADM

## 2017-11-27 RX ORDER — EPINEPHRINE 0.1 MG/ML
1 INJECTION INTRACARDIAC; INTRAVENOUS
Status: ACTIVE | OUTPATIENT
Start: 2017-11-27 | End: 2017-11-27

## 2017-11-27 RX ORDER — MIDAZOLAM HYDROCHLORIDE 1 MG/ML
.25-1 INJECTION, SOLUTION INTRAMUSCULAR; INTRAVENOUS
Status: ACTIVE | OUTPATIENT
Start: 2017-11-27 | End: 2017-11-27

## 2017-11-27 RX ORDER — SODIUM CHLORIDE 9 MG/ML
100 INJECTION, SOLUTION INTRAVENOUS CONTINUOUS
Status: DISPENSED | OUTPATIENT
Start: 2017-11-27 | End: 2017-11-27

## 2017-11-27 RX ORDER — SODIUM CHLORIDE, SODIUM LACTATE, POTASSIUM CHLORIDE, CALCIUM CHLORIDE 600; 310; 30; 20 MG/100ML; MG/100ML; MG/100ML; MG/100ML
INJECTION, SOLUTION INTRAVENOUS
Status: DISCONTINUED | OUTPATIENT
Start: 2017-11-27 | End: 2017-11-27 | Stop reason: HOSPADM

## 2017-11-27 RX ORDER — ONDANSETRON 2 MG/ML
INJECTION INTRAMUSCULAR; INTRAVENOUS AS NEEDED
Status: DISCONTINUED | OUTPATIENT
Start: 2017-11-27 | End: 2017-11-27 | Stop reason: HOSPADM

## 2017-11-27 RX ORDER — DEXTROMETHORPHAN/PSEUDOEPHED 2.5-7.5/.8
1.2 DROPS ORAL
Status: DISCONTINUED | OUTPATIENT
Start: 2017-11-27 | End: 2017-11-27 | Stop reason: HOSPADM

## 2017-11-27 RX ORDER — PROPOFOL 10 MG/ML
INJECTION, EMULSION INTRAVENOUS AS NEEDED
Status: DISCONTINUED | OUTPATIENT
Start: 2017-11-27 | End: 2017-11-27 | Stop reason: HOSPADM

## 2017-11-27 RX ORDER — SUCCINYLCHOLINE CHLORIDE 20 MG/ML
INJECTION INTRAMUSCULAR; INTRAVENOUS AS NEEDED
Status: DISCONTINUED | OUTPATIENT
Start: 2017-11-27 | End: 2017-11-27 | Stop reason: HOSPADM

## 2017-11-27 RX ORDER — SODIUM CHLORIDE 0.9 % (FLUSH) 0.9 %
5-10 SYRINGE (ML) INJECTION EVERY 8 HOURS
Status: ACTIVE | OUTPATIENT
Start: 2017-11-27 | End: 2017-11-27

## 2017-11-27 RX ORDER — ROCURONIUM BROMIDE 10 MG/ML
INJECTION, SOLUTION INTRAVENOUS AS NEEDED
Status: DISCONTINUED | OUTPATIENT
Start: 2017-11-27 | End: 2017-11-27 | Stop reason: HOSPADM

## 2017-11-27 RX ORDER — NALOXONE HYDROCHLORIDE 0.4 MG/ML
0.4 INJECTION, SOLUTION INTRAMUSCULAR; INTRAVENOUS; SUBCUTANEOUS
Status: ACTIVE | OUTPATIENT
Start: 2017-11-27 | End: 2017-11-27

## 2017-11-27 RX ORDER — FLUMAZENIL 0.1 MG/ML
0.2 INJECTION INTRAVENOUS
Status: ACTIVE | OUTPATIENT
Start: 2017-11-27 | End: 2017-11-27

## 2017-11-27 RX ORDER — FENTANYL CITRATE 50 UG/ML
100 INJECTION, SOLUTION INTRAMUSCULAR; INTRAVENOUS
Status: ACTIVE | OUTPATIENT
Start: 2017-11-27 | End: 2017-11-27

## 2017-11-27 RX ADMIN — SUCCINYLCHOLINE CHLORIDE 120 MG: 20 INJECTION INTRAMUSCULAR; INTRAVENOUS at 09:36

## 2017-11-27 RX ADMIN — ONDANSETRON 4 MG: 2 INJECTION INTRAMUSCULAR; INTRAVENOUS at 09:41

## 2017-11-27 RX ADMIN — PROPOFOL 150 MG: 10 INJECTION, EMULSION INTRAVENOUS at 09:36

## 2017-11-27 RX ADMIN — ROCURONIUM BROMIDE 5 MG: 10 INJECTION, SOLUTION INTRAVENOUS at 09:36

## 2017-11-27 RX ADMIN — LIDOCAINE HYDROCHLORIDE 60 MG: 20 INJECTION, SOLUTION EPIDURAL; INFILTRATION; INTRACAUDAL; PERINEURAL at 09:36

## 2017-11-27 RX ADMIN — SODIUM CHLORIDE, SODIUM LACTATE, POTASSIUM CHLORIDE, CALCIUM CHLORIDE: 600; 310; 30; 20 INJECTION, SOLUTION INTRAVENOUS at 09:39

## 2017-11-27 NOTE — H&P
[address], [address] [place] [address]      History and Physical       NAME:  Carmen Calvillo   :   1965   MRN:   [id number]             History of Present Illness:  Patient is a 45 y. o. who is seen for gastro-gastric fistula and silastic ring removal. She has a history of remote gastric bypass. PMH:  Past Medical History:   Diagnosis Date    Anemia     Chronic low back pain 10/2/2010    Dyspepsia and other specified disorders of function of stomach     Gigantomastia 10/2/2010    Incontinence of urine 10/2/2010    Morbid obesity ([address] 10/2/2010    Musculoskeletal disorder     Pain, upper back 10/2/2010    Psychosocial circumstance 10/2/2010    Right knee pain 10/2/2010    Shoulder pain 10/2/2010    Sleep apnea 10/2/2010    SOB (shortness of breath) 10/2/2010    Status post gastric bypass for obesity 10/2/2010    Thyroid disease        PSH:  Past Surgical History:   Procedure Laterality Date    GASTRIC BYPASS,OBESE<150CM AUREA-EN-Y  02    decompression of 5by 7cm chocolate cyst     HX ABDOMINOPLASTY  04    HX BREAST REDUCTION      HX CHOLECYSTECTOMY  02        HX CYST REMOVAL  91    left wrist     HX OTHER SURGICAL  05    thigh lift    HX TUBAL LIGATION      [facility]    IR IVC FILTER  02        VAGOTOMY/PYLOROPLASTY,HI SELECT  02           Allergies: Allergies   Allergen Reactions    Aspirin Other (comments)     Due to GBP surgery    Other Medication Other (comments)     Steroids, can't take due to Gastric Bypass surgery       Home Medications:  Prior to Admission Medications   Prescriptions Last Dose Informant Patient Reported? Taking? CALCIUM CARBONATE/VITAMIN D3 (CALCIUM + D PO) 2017 at Unknown time  Yes Yes   Sig: Take  by mouth. ERGOCALCIFEROL, VITAMIN D2, (VITAMIN D PO) 2017 at Unknown time  Yes Yes   Sig: Take  by mouth.    MULTI-VITAMIN PO 11/26/2017 at Unknown time  Yes Yes   Sig: Take  by mouth. PREMPRO 0.625-2.5 mg per tablet 11/26/2017 at Unknown time  Yes Yes   cyanocobalamin (VITAMIN B-12) 1,000 mcg sublingual tablet 11/26/2017 at Unknown time  Yes Yes   Sig: Take 1,000 mcg by mouth daily. levothyroxine (SYNTHROID) 175 mcg tablet 11/26/2017 at Unknown time  Yes Yes   Sig: Take 112 mcg by mouth Daily (before breakfast). omeprazole (PRILOSEC) 40 mg capsule 11/26/2017 at Unknown time  Yes Yes   Sig: Take 40 mg by mouth daily.       Facility-Administered Medications: None       Hospital Medications:  Current Facility-Administered Medications   Medication Dose Route Frequency    0.9% sodium chloride infusion  100 mL/hr IntraVENous CONTINUOUS    sodium chloride (NS) flush 5-10 mL  5-10 mL IntraVENous Q8H    sodium chloride (NS) flush 5-10 mL  5-10 mL IntraVENous PRN    midazolam (VERSED) injection 0.25-10 mg  0.25-10 mg IntraVENous Multiple    fentaNYL citrate (PF) injection 100 mcg  100 mcg IntraVENous Multiple    naloxone (NARCAN) injection 0.4 mg  0.4 mg IntraVENous Multiple    flumazenil (ROMAZICON) 0.1 mg/mL injection 0.2 mg  0.2 mg IntraVENous Multiple    simethicone (MYLICON) 53LE/0.0RJ oral drops 80 mg  1.2 mL Oral Multiple    diphenhydrAMINE (BENADRYL) injection 50 mg  50 mg IntraVENous ONCE    atropine injection 0.5 mg  0.5 mg IntraVENous ONCE PRN    EPINEPHrine (ADRENALIN) 0.1 mg/mL syringe 1 mg  1 mg Endoscopically ONCE PRN       Social History:  Social History   Substance Use Topics    Smoking status: Former Smoker    Smokeless tobacco: Never Used    Alcohol use Yes      Comment: rare       Family History:  Family History   Problem Relation Age of Onset    Cancer Mother     Heart defect Mother      irregular heat beat    Heart Disease Mother     Arthritis-rheumatoid Father     Hypertension Father     Diabetes Father     Heart Disease Father     Diabetes Sister     Heart Disease Sister     Diabetes Brother  Heart Disease Brother     Stroke Paternal Grandmother              Review of Systems:      Constitutional: negative fever, negative chills, negative weight loss  Eyes:   negative visual changes  ENT:   negative sore throat, tongue or lip swelling  Respiratory:  negative cough, negative dyspnea  Cards:  negative for chest pain, palpitations, lower extremity edema  GI:   See HPI  :  negative for frequency, dysuria  Integument:  negative for rash and pruritus  Heme:  negative for easy bruising and gum/nose bleeding  Musculoskel: negative for myalgias,  back pain and muscle weakness  Neuro: negative for headaches, dizziness, vertigo  Psych:  negative for feelings of anxiety, depression       Objective:   Patient Vitals for the past 8 hrs:   Temp Pulse Resp SpO2 Height Weight   11/27/17 0852 98.3 °F (36.8 °C) 63 16 96 % 4' 11\" (1.499 m) 87.8 kg (193 lb 9 oz)             EXAM:     NEURO-a&o   HEENT-wnl   LUNGS-clear    COR-regular rate and rhythym     ABD-soft , no tenderness, no rebound, good bs     EXT-no edema     Data Review     No results for input(s): WBC, HGB, HCT, PLT, HGBEXT, HCTEXT, PLTEXT in the last 72 hours. No results for input(s): NA, K, CL, CO2, BUN, CREA, GLU, PHOS, CA in the last 72 hours. No results for input(s): SGOT, GPT, AP, TBIL, TP, ALB, GLOB, GGT, AML, LPSE in the last 72 hours. No lab exists for component: AMYP, HLPSE  No results for input(s): INR, PTP, APTT in the last 72 hours. No lab exists for component: INREXT       Assessment:   · History of gastric bypass  · Gastro-gastric fistula  · Silastic ring removal     Patient Active Problem List   Diagnosis Code    Status post gastric bypass for obesity Z98.84    Morbid obesity ([facility] E66.01    Sleep apnea G47.30    Chronic low back pain M54.5, G89.29    Right knee pain M25.561    Pain, upper back M54.9    Anemia D64.9    Acquired hypothyroidism E03.9     Plan:   · Endoscopic procedure with GA     Signed By: Merlinda Och. Bartholomew Hasten, MD     11/27/2017  9:32 AM

## 2017-11-27 NOTE — ANESTHESIA PREPROCEDURE EVALUATION
Anesthetic History   No history of anesthetic complications            Review of Systems / Medical History  Patient summary reviewed, nursing notes reviewed and pertinent labs reviewed    Pulmonary  Within defined limits                 Neuro/Psych   Within defined limits           Cardiovascular  Within defined limits                     GI/Hepatic/Renal  Within defined limits              Endo/Other  Within defined limits           Other Findings              Physical Exam    Airway  Mallampati: I  TM Distance: > 6 cm  Neck ROM: normal range of motion   Mouth opening: Normal     Cardiovascular  Regular rate and rhythm,  S1 and S2 normal,  no murmur, click, rub, or gallop             Dental  No notable dental hx       Pulmonary  Breath sounds clear to auscultation               Abdominal  GI exam deferred       Other Findings            Anesthetic Plan    ASA: 2  Anesthesia type: general          Induction: Intravenous  Anesthetic plan and risks discussed with: Patient

## 2017-11-27 NOTE — IP AVS SNAPSHOT
[address] 
[phone] Patient: Jerry Oneill MRN: [id number] :1965 About your hospitalization You were admitted on:  2017 You last received care in the:  [facility] ENDOSCOPY You were discharged on:  2017 Why you were hospitalized Your primary diagnosis was:  Not on File Things You Need To Do (next 8 weeks) Friday Dec 01, 2017 NUTRITION COUNSELING with Paul Molina RD at  8:00 AM  
Where: [address] ([facility]) Discharge Orders None A check matty indicates which time of day the medication should be taken. My Medications TAKE these medications as instructed Instructions Each Dose to Equal  
 Morning Noon Evening Bedtime CALCIUM + D PO Your last dose was: Your next dose is: Take  by mouth. MULTI-VITAMIN PO Your last dose was: Your next dose is: Take  by mouth. omeprazole 40 mg capsule Commonly known as:  PRILOSEC Your last dose was: Your next dose is: Take 40 mg by mouth daily. 40 mg PREMPRO 0.625-2.5 mg per tablet Generic drug:  estrogen (conjugated)-medroxyPROGESTERone Your last dose was: Your next dose is:    
   
   
      
   
   
   
  
 SYNTHROID 175 mcg tablet Generic drug:  levothyroxine Your last dose was: Your next dose is: Take 112 mcg by mouth Daily (before breakfast). 112 mcg VITAMIN B-12 1,000 mcg sublingual tablet Generic drug:  cyanocobalamin Your last dose was: Your next dose is: Take 1,000 mcg by mouth daily. 1000 mcg VITAMIN D2 PO Your last dose was: Your next dose is: Take  by mouth. Discharge Instructions [address] 
[address] [facility], [address] EGD DISCHARGE INSTRUCTIONS Beni Hernandez [id number] 
1965 Discomfort: 
Sore throat- throat lozenges or warm salt water gargle 
redness at IV site- apply warm compress to area; if redness or soreness persist- contact your physician Gaseous discomfort- walking, belching will help relieve any discomfort You may not operate a vehicle for 12 hours You may not engage in an occupation involving machinery or appliances for rest of today You may not drink alcoholic beverages for at least 12 hours Avoid making any critical decisions for at least 24 hour DIET You may resume your regular diet  however -  remember your colon is empty and a heavy meal will produce gas. Avoid these foods:  vegetables, fried / greasy foods, carbonated drinks ACTIVITY You may resume your normal daily activities Spend the remainder of the day resting -  avoid any strenuous activity. CALL M.D. ANY SIGN OF Increasing pain, nausea, vomiting Abdominal distension (swelling) New increased bleeding (oral or rectal) Fever (chills) Pain in chest area Bloody discharge from nose or mouth Shortness of breath Follow-up Instructions: 
 Call Dr. Darlene Powers for any questions or problems. Telephone # [phone] ENDOSCOPY FINDINGS: 
 Your endoscopy showed anatomy consistent with gastric bypass. We will inform Dr. Luis Antonio Bell of these results. Signed By: [facility] HospitalGrace Lauren MD   
 11/27/2017  9:52 AM 
  
 
 
  
  
  
Introducing [facility]! Dear Sukhdev Villanueva: Thank you for requesting a [employer] account. Our records indicate that you already have an active [employer] account. You can access your account anytime at [email or web address] [email or web address] Did you know that you can access your hospital and ER discharge instructions at any time in KARALIThart? You can also review all of your test results from your hospital stay or ER visit. Additional Information If you have questions, please visit the Frequently Asked Questions section of the [employer] website at [email or web address] Remember, KARALIThart is NOT to be used for urgent needs. For medical emergencies, dial 911. Now available from your iPhone and Android! Providers Seen During Your Hospitalization Provider Specialty Primary office phone Murali Brody MD Gastroenterology [phone] Your Primary Care Physician (PCP) Primary Care Physician Office Phone Office Fax OTHER, PHYS ** None ** ** None ** You are allergic to the following Allergen Reactions Aspirin Other (comments) Due to GBP surgery Other Medication Other (comments) Steroids, can't take due to Gastric Bypass surgery Recent Documentation Height Weight Breastfeeding? BMI OB Status Smoking Status 1.499 m 87.8 kg No 39.09 kg/m2 Postmenopausal Former Smoker Emergency Contacts Name Discharge Info Relation Home Work Mobile Gio Ann DISCHARGE CAREGIVER [3] Friend [5] [phone] Patient Belongings The following personal items are in your possession at time of discharge: 
  Dental Appliances: None  Visual Aid: None Please provide this summary of care documentation to your next provider. Signatures-by signing, you are acknowledging that this After Visit Summary has been reviewed with you and you have received a copy. Patient Signature:  ____________________________________________________________ Date:  ____________________________________________________________  
  
Kaye Kline Provider Signature:  ____________________________________________________________ Date:  ____________________________________________________________

## 2017-11-27 NOTE — PROCEDURES
[address] [place] [address], [address] [place] [address]          [facility]- Gastroduodenoscopy (EGD) Procedure Note    Chanda Rojas  1965  [id number]      Procedure: Endoscopic Gastroduodenoscopy --diagnostic    Indication: weight gain after gastric bypass, suspected gastro-gastric fistula, suspected eroded silastic ring    Pre-operative Diagnosis: see indication above    Post-operative Diagnosis: see findings below    : Jamel Perez. Louisa Krishnan MD    Referring Provider: Lucio Patel MD    Anesthesia/Sedation:  General anesthesia        Procedure Details     After informed consent was obtained for the procedure, with all risks and benefits of procedure explained the patient was taken to the endoscopy suite and placed in the left lateral decubitus position. Following sequential administration of sedation as per above, the endoscope was inserted into the mouth and advanced under direct vision to distal Mark limb. A careful inspection was made as the gastroscope was withdrawn, including a retroflexed view of the proximal stomach; findings and interventions are described below. Findings:   Esophagus: normal mucosa, normal Z line at 33 cm  Stomach: small hiatal hernia with endoscopic evidence of prolapse into the esophagus. The gastric pouch was spherical in shape. The pouch length was 10 cm. The G-J anastomosis was located at 43 cm. There was no evidence of silastic ring erosion into the gastric pouch. A gastro-gastric fistula was not appreciated on forward view or on retroflexion within the gastric pouch. The GJ anastomosis was relatively narrow, but the endoscope traversed this without encountering resistance. There was no evidence of marginal ulceration. On the jejunal side, the blind end was reached after traversing a fixed angulated turn in the lumen. This was located at 48 cm. Retroflexion could not be performed on the jejunal side of the anastomosis.  This was likely due to the anatomy of the blind end. The scope was advanced distally to the length of the upper endoscope. Bile reflux was not seen. The J-J anastomosis was not reached. Therapies:  none    Specimens: none         EBL: None      Complications:   None; patient tolerated the procedure well. Impression:    As above    Recommendations: Follow up with Dr. Pierre Barrera By: Ruby Zuleta.  Kate Trejo MD     11/27/2017  9:57 AM

## 2017-11-27 NOTE — ROUTINE PROCESS
Emily Parmar  1965  [id number]    Situation:  Verbal report received from: Lilian  Procedure: Procedure(s):  ESOPHAGOGASTRODUODENOSCOPY (EGD)    Background:    Preoperative diagnosis: ACID REFLUX  Postoperative diagnosis: hx gastric vbypass    :  Dr. Vonnie Cisse  Assistant(s): Endoscopy Technician-1: Sweetie Daily IV  Endoscopy RN-1: Sherry Burgos RN    Specimens: * No specimens in log *  H. Pylori  no    Assessment:  Intra-procedure medications     Anesthesia gave intra-procedure sedation and medications, see anesthesia flow sheet yes    Intravenous fluids: NS@ KVO     Vital signs stable     Abdominal assessment: round and soft     Recommendation:  Discharge patient per MD order.     Family or Friend   Permission to share finding with family or friend yes

## 2017-11-27 NOTE — ANESTHESIA POSTPROCEDURE EVALUATION
Post-Anesthesia Evaluation and Assessment    Patient: Yamel Honeycutt MRN: [id number]  SSN: [id number]    YOB: 1965  Age: 45 y.o. Sex: female       Cardiovascular Function/Vital Signs  Visit Vitals    /81    Pulse 79    Temp 36.8 °C (98.3 °F)    Resp 19    Ht 4' 11\" (1.499 m)    Wt 87.8 kg (193 lb 9 oz)    SpO2 95%    Breastfeeding No    BMI 39.09 kg/m2       Patient is status post general anesthesia for Procedure(s):  ESOPHAGOGASTRODUODENOSCOPY (EGD). Nausea/Vomiting: None    Postoperative hydration reviewed and adequate. Pain:  Pain Scale 1: Numeric (0 - 10) (11/27/17 0957)  Pain Intensity 1: 0 (11/27/17 0957)   Managed    Neurological Status: At baseline    Mental Status and Level of Consciousness: Arousable    Pulmonary Status:   O2 Device: Nasal cannula (11/27/17 0948)   Adequate oxygenation and airway patent    Complications related to anesthesia: None    Post-anesthesia assessment completed.  No concerns    Signed By: Harry Marlow MD     November 27, 2017

## 2017-11-27 NOTE — DISCHARGE INSTRUCTIONS
[address] [facility] [address], [address] [facility]    EGD DISCHARGE INSTRUCTIONS    Ryley Officer  [id number]  1965    Discomfort:  Sore throat- throat lozenges or warm salt water gargle  redness at IV site- apply warm compress to area; if redness or soreness persist- contact your physician  Gaseous discomfort- walking, belching will help relieve any discomfort  You may not operate a vehicle for 12 hours  You may not engage in an occupation involving machinery or appliances for rest of today  You may not drink alcoholic beverages for at least 12 hours  Avoid making any critical decisions for at least 24 hour  DIET  You may resume your regular diet - however -  remember your colon is empty and a heavy meal will produce gas. Avoid these foods:  vegetables, fried / greasy foods, carbonated drinks    ACTIVITY  You may resume your normal daily activities   Spend the remainder of the day resting -  avoid any strenuous activity. CALL M.D. ANY SIGN OF   Increasing pain, nausea, vomiting  Abdominal distension (swelling)  New increased bleeding (oral or rectal)  Fever (chills)  Pain in chest area  Bloody discharge from nose or mouth  Shortness of breath    Follow-up Instructions:   Call Dr. Joseluis Wilson for any questions or problems. Telephone # [phone]    ENDOSCOPY FINDINGS:   Your endoscopy showed anatomy consistent with gastric bypass. We will inform Dr. Lissy Tabor of these results. Signed By: Joe Sheldon MD     11/27/2017  9:52 AM

## 2017-12-15 ENCOUNTER — CLINICAL SUPPORT (OUTPATIENT)
Dept: SURGERY | Age: 52
End: 2017-12-15

## 2017-12-15 VITALS — BODY MASS INDEX: 39.18 KG/M2 | WEIGHT: 194 LBS

## 2017-12-15 DIAGNOSIS — E66.9 OBESITY (BMI 30-39.9): Primary | ICD-10-CM

## 2017-12-15 NOTE — PROGRESS NOTES
[address] [facility] at [facility]  Supervised Weight Loss     Date:   12/15/2017    Patient's Name: Carmen Calvillo  : 1965    Insurance:  [facility]          Session: 3   Surgery: Gastric Bypass Revision  Surgeon:  Delano Esteves M.D. Height: 60\"   Weight:    194      Lbs. BMI: 39   Pounds Lost since last month: 0               Pounds Gained since last month: 1#    Starting Weight: 194#   Previous Months Weight:  193#  Overall Pounds Lost: 0  Overall Pounds Gained: 0    Other Pertinent Information: n/a     Smoking Status:  none  Alcohol Intake: none    I have reviewed with patient the guidelines of the supervised weight loss class. Patient understands the expectations of some weight loss during the weight loss trial.  Patient understands that weight gain could delay the process. I have also expressed to patient that classes need to be consecutive. Missing a class may subject patient to have to start their trial over. Patient has received this information in writing. Changes that patient has made since last month include:  Eating more protein, more exercise and walking. Eating Habits and Behaviors      Today we reviewed the general diet principles for weight loss surgery. An education lesson was provided specific to protein. We discussed why protein is important after surgery, how much protein is needed per day (60-80 grams) and how to achieve protein goals. We discussed various food sources of protein and how many grams of protein per serving. We discussed how to use protein supplements, powders and shakes and how to purchase these products. Emphasis was placed on the importance of eating 3 meals a day to help promote weight loss and achieve protein goals. We talked about healthy methods of cooking and cooking tips to help with better tolerance of protein foods after surgery. Patient's current diet habits include: eating 3-4 small meals per day. Pt reports fullness from 1/2 cup to 3/4 cup food per meal. Reports some difficulty with tolerating tough/dry meats and protein foods. Has somewhat developed a fear of eating for fear of gaining weight and fear of food intolerance. Pt was previously skipping dinner most nights for fear of weight gain. We have previously talked about the importance of eating 3 meals a day to promote wt loss and to achieve recommended protein goals now and also after surgery if revision is approved. Pt does meal plan and prep and packs meals when away from home. Food choices are mostly nutrient-dense and healthy. Eating baked, grilled and broiled foods. Drinking only water and unsweetened tea. Does not drink calories or sugar sweetened beverages. Does not drink with meals and does not use straws. Physical Activity/Exercise  During class we discussed the importance of increasing daily physical activity and beginning to develop an exercise regimen/routine. We discussed that exercise is an important part of long term weight loss. Comments:  During class, I discussed with patient the importance of getting into an exercise routine. Patient is currently walking for 30 minutes almost daily for activity. Patient has been encouraged to maintain and increase as tolerated. Behavior Modification       Comments: We discussed the importance of eating mindfully after weight loss surgery to prevent food intolerance and prevent weight regain. We talked about how to eat more mindfully and identify emotional eating triggers. Tips and recommendations for how to make these changes were provided. Patient was encouraged to keep a food journal and record what they were taking in daily. Overall Assessment: Patient demonstrates small changes evidenced by reports of working on 3 meals a day and working to increase protein. Still pt with some difficulties tolerating certain protein foods.   We discussed moist methods of cooking to help improve tolerance and the importance of practicing mindful eating behavior to prevent intolerance. Pt was encouraged to continue to focus on protein at each meal/snack. She will also try different protein supplements for variety and better tolerance. Patient-Set Goals:   1. Nutrition - continue working towards 3 meals a day. 2. Exercise - maintain and increase as tolerated  3.  Behavior -research protein supplements and cooking methods/recipes    David Mccrary RD  12/15/2017

## 2018-01-15 ENCOUNTER — OFFICE VISIT (OUTPATIENT)
Dept: SURGERY | Age: 53
End: 2018-01-15

## 2018-01-15 VITALS
HEART RATE: 89 BPM | SYSTOLIC BLOOD PRESSURE: 118 MMHG | HEIGHT: 59 IN | TEMPERATURE: 98.4 F | BODY MASS INDEX: 38.71 KG/M2 | WEIGHT: 192 LBS | OXYGEN SATURATION: 96 % | RESPIRATION RATE: 20 BRPM | DIASTOLIC BLOOD PRESSURE: 80 MMHG

## 2018-01-15 DIAGNOSIS — Z98.84 STATUS POST GASTRIC BYPASS FOR OBESITY: ICD-10-CM

## 2018-01-15 DIAGNOSIS — K21.9 GASTROESOPHAGEAL REFLUX DISEASE WITHOUT ESOPHAGITIS: Primary | ICD-10-CM

## 2018-01-15 NOTE — PROGRESS NOTES
Patient returns to follow up regarding testing and ongoing RD work with Marina Solomon RD    Insurance:  [facility]          Session: 3 of  6  Surgery: Gastric Bypass Revision                                 Surgeon:  Donte Mercedes M.D.   [facility]: 60\"                                     Weight:    194      Lbs. BMI: 39                       Pounds Lost since last month: 0               Pounds Gained since last month: 1#   Starting Weight: 194#                                           Previous Months Weight:             193#  Overall Pounds Lost: 0                                        Overall Pounds Gained: 0   Other Pertinent Information: n/a    Smoking Status:  none  Alcohol Intake: none  Overall Assessment: Patient demonstrates small changes evidenced by reports of working on 3 meals a day and working to increase protein. Still pt with some difficulties tolerating certain protein foods. We discussed moist methods of cooking to help improve tolerance and the importance of practicing mindful eating behavior to prevent intolerance. Pt was encouraged to continue to focus on protein at each meal/snack. She will also try different protein supplements for variety and better tolerance.    Patient-Set Goals:   1. Nutrition - continue working towards 3 meals a day. 2. Exercise - maintain and increase as tolerated  3.  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/2010    Shoulder pain 10/02/2010    Incontinence of urine 10/02/2010    Psychosocial circumstance 10/02/2010     Past Medical History:   Diagnosis Date    Anemia     Chronic low back pain 10/2/2010    Dyspepsia and other specified disorders of function of stomach     Gigantomastia 10/2/2010    Incontinence of urine 10/2/2010    Morbid obesity ([address] 10/2/2010    Musculoskeletal disorder     Pain, upper back 10/2/2010    Psychosocial circumstance 10/2/2010    Right knee pain 10/2/2010    Shoulder pain 10/2/2010    Sleep apnea 10/2/2010    SOB (shortness of breath) 10/2/2010    Status post gastric bypass for obesity 10/2/2010    Thyroid disease      Current Outpatient Prescriptions on File Prior to Visit   Medication Sig Dispense Refill    PREMPRO 0.625-2.5 mg per tablet       omeprazole (PRILOSEC) 40 mg capsule Take 40 mg by mouth daily.  cyanocobalamin (VITAMIN B-12) 1,000 mcg sublingual tablet Take 1,000 mcg by mouth daily.  CALCIUM CARBONATE/VITAMIN D3 (CALCIUM + D PO) Take  by mouth.  levothyroxine (SYNTHROID) 175 mcg tablet Take 112 mcg by mouth Daily (before breakfast).  MULTI-VITAMIN PO Take  by mouth.  ERGOCALCIFEROL, VITAMIN D2, (VITAMIN D PO) Take  by mouth. No current facility-administered medications on file prior to visit. /80 (BP 1 Location: Left arm, BP Patient Position: Sitting)  Pulse 89  Temp 98.4 °F (36.9 °C) (Oral)   Resp 20  Ht 4' 11\" (1.499 m)  Wt 192 lb (87.1 kg)  SpO2 96%  BMI 38.78 kg/m2  ENMT: normocephalic, atraumatic   Respiratory: excursions normal and symmetrical  Cardiovascular: Regular rate and rhythm  Abdomen:  Soft, no guarding, no distension, no obvious hernia  Musculoskeletal: normal gait/station  Neuro: symmetrical  Psych: alert and oriented to person, place and time    UGI      EGD Findings:   Esophagus: normal mucosa, normal Z line at 33 cm  Stomach: small hiatal hernia with endoscopic evidence of prolapse into the esophagus.  The gastric pouch was spherical in shape. The pouch length was 10 cm. The G-J anastomosis was located at 43 cm. There was no evidence of silastic ring erosion into the gastric pouch. A gastro-gastric fistula was not appreciated on forward view or on retroflexion within the gastric pouch. The GJ anastomosis was relatively narrow, but the endoscope traversed this without encountering resistance. There was no evidence of marginal ulceration. On the jejunal side, the blind end was reached after traversing a fixed angulated turn in the lumen. This was located at 48 cm. Retroflexion could not be performed on the jejunal side of the anastomosis. This was likely due to the anatomy of the blind end. The scope was advanced distally to the length of the upper endoscope. Bile reflux was not seen. The J-J anastomosis was not reached. Impression  Primary issue appears to be GERD requiring daily medications including both prilosec and prn zantac for breakthrough sx- regurgitation does awaken her from sleep regularly and she has also experienced aspiration episodes during the night due to reflux. She does experience feedback from her gastric bypass with difficulty eating dense foods like meat or bread due to dysphagia. At this point I have suggested to her that we can not likely improve upon her restrictive feedback due to the fact that she already has solid food dysphagia despite the anatomic appearance of her gastric bypass. However a reasonable goal might be to offer her better treatment of her GERD via revision to include modified fundoplication. She will be scheduled for esophageal manometry at [facility] in [place]-  Will ask Dr Edwige Abebe to read the study    More than 50% of this encounter was spent in direct counseling for this patient Counseling included topics such as the ongoing evaluation and treatment as well as options for future care.   All questions have been answered in detail and the patient has expressed satisfaction regarding understanding of all this information. At least 25 minutes were spent in direct patient contact including more than 50% of the time spent directly counseling the patient as above during this encounter.

## 2018-01-15 NOTE — PROGRESS NOTES
1. Have you been to the ER, urgent care clinic since your last visit? Hospitalized since your last visit? No    2. Have you seen or consulted any other health care providers outside of the [address] [place] [address] since your last visit? Include any pap smears or colon screening.  No

## 2018-01-15 NOTE — MR AVS SNAPSHOT
[address] [facility] [address] [facility] [address] [address] 
[phone] Patient: Maria C Ling MRN: [id number] :1965 Visit Information Date & Time Provider Department Dept. Phone Encounter #  
 1/15/2018  9:00 AM Shin Chaney [id number] Your Appointments 2018  9:00 AM  
NUTRITION COUNSELING with Jhonnie Fabry, RD [address] [facility] ([facility]) Appt Note: supervised wt loss - #4  
 [address] [facility]  [address] [facility] [address]  
[phone]  
  
   
 [address] [facility] [address] [facility] [address] [address] Upcoming Health Maintenance Date Due Hepatitis C Screening 1965 DTaP/Tdap/Td series (1 - Tdap) 10/17/1984 PAP AKA CERVICAL CYTOLOGY 10/17/1984 BREAST CANCER SCRN MAMMOGRAM 10/17/2013 FOBT Q 1 YEAR AGE 50-75 10/17/2013 Influenza Age 5 to Adult 2017 Allergies as of 1/15/2018  Review Complete On: 1/15/2018 By: Andrew Blackwood Severity Noted Reaction Type Reactions Aspirin  2012    Other (comments) Due to GBP surgery Other Medication  2012    Other (comments) Steroids, can't take due to Gastric Bypass surgery Current Immunizations  Never Reviewed No immunizations on file. Not reviewed this visit You Were Diagnosed With   
  
 Codes Comments Gastroesophageal reflux disease without esophagitis    -  Primary ICD-10-CM: K21.9 ICD-9-CM: 530.81 Status post gastric bypass for obesity     ICD-10-CM: Z98.84 ICD-9-CM: V45.86 Vitals BP Pulse Temp Resp Height(growth percentile) Weight(growth percentile) 118/80 (BP 1 Location: Left arm, BP Patient Position: Sitting) 89 98.4 °F (36.9 °C) (Oral) 20 4' 11\" (1.499 m) 192 lb (87.1 kg) SpO2 BMI OB Status Smoking Status 96% 38.78 kg/m2 Postmenopausal Former Smoker Vitals History BMI and BSA Data Body Mass Index Body Surface Area 38.78 kg/m 2 1.9 m 2 Preferred Pharmacy Pharmacy Name Phone [id number] Your Updated Medication List  
  
   
This list is accurate as of: 1/15/18  1:09 PM.  Always use your most recent med list.  
  
  
  
  
 CALCIUM + D PO Take  by mouth. MULTI-VITAMIN PO Take  by mouth. omeprazole 40 mg capsule Commonly known as:  PRILOSEC Take 40 mg by mouth daily. PREMPRO 0.625-2.5 mg per tablet Generic drug:  estrogen (conjugated)-medroxyPROGESTERone SYNTHROID 175 mcg tablet Generic drug:  levothyroxine Take 112 mcg by mouth Daily (before breakfast). VITAMIN B-12 1,000 mcg sublingual tablet Generic drug:  cyanocobalamin Take 1,000 mcg by mouth daily. VITAMIN D2 PO Take  by mouth. Introducing [facility]! Dear Edna Mendoza: Thank you for requesting a [employer] account. Our records indicate that you already have an active [employer] account. You can access your account anytime at [email or web address] Did you know that you can access your hospital and ER discharge instructions at any time in [employer]? You can also review all of your test results from your hospital stay or ER visit. Additional Information If you have questions, please visit the Frequently Asked Questions section of the [employer] website at [email or web address] Remember, [employer] is NOT to be used for urgent needs. For medical emergencies, dial 911. Now available from your iPhone and Android! Please provide this summary of care documentation to your next provider. Your primary care clinician is listed as Phys Other. If you have any questions after today's visit, please call [phone].

## 2018-01-18 ENCOUNTER — CLINICAL SUPPORT (OUTPATIENT)
Dept: SURGERY | Age: 53
End: 2018-01-18

## 2018-01-18 VITALS — WEIGHT: 193 LBS | BODY MASS INDEX: 38.98 KG/M2

## 2018-01-18 DIAGNOSIS — E66.9 OBESITY (BMI 30-39.9): Primary | ICD-10-CM

## 2018-02-05 ENCOUNTER — HOSPITAL ENCOUNTER (OUTPATIENT)
Age: 53
Setting detail: OUTPATIENT SURGERY
Discharge: HOME | End: 2018-02-05
Attending: INTERNAL MEDICINE | Admitting: INTERNAL MEDICINE
Payer: COMMERCIAL

## 2018-02-05 VITALS
RESPIRATION RATE: 16 BRPM | SYSTOLIC BLOOD PRESSURE: 126 MMHG | OXYGEN SATURATION: 97 % | HEART RATE: 81 BPM | HEIGHT: 59 IN | BODY MASS INDEX: 38.91 KG/M2 | WEIGHT: 193 LBS | DIASTOLIC BLOOD PRESSURE: 79 MMHG

## 2018-02-05 PROCEDURE — 74011000250 HC RX REV CODE- 250: Performed by: INTERNAL MEDICINE

## 2018-02-05 PROCEDURE — 76040000007: Performed by: INTERNAL MEDICINE

## 2018-02-05 RX ORDER — LIDOCAINE HYDROCHLORIDE 20 MG/ML
JELLY TOPICAL ONCE
Status: COMPLETED | OUTPATIENT
Start: 2018-02-05 | End: 2018-02-05

## 2018-02-05 RX ADMIN — LIDOCAINE HYDROCHLORIDE 5 MG: 20 JELLY TOPICAL at 10:34

## 2018-02-05 NOTE — PROGRESS NOTES
5 cc viscous lidocaine inhaled into bilateral nare per MD orders. Probe inserted into  right nare without difficulty. Pt tolerated procedure well.

## 2018-02-05 NOTE — IP AVS SNAPSHOT
[address] 
[phone] Patient: Neeta Espinal MRN: [id number] :1965 A check matty indicates which time of day the medication should be taken. My Medications ASK your doctor about these medications Instructions Each Dose to Equal  
 Morning Noon Evening Bedtime CALCIUM + D PO Your last dose was: Your next dose is: Take  by mouth. MULTI-VITAMIN PO Your last dose was: Your next dose is: Take  by mouth. omeprazole 40 mg capsule Commonly known as:  PRILOSEC Your last dose was: Your next dose is: Take 40 mg by mouth daily. 40 mg PREMPRO 0.625-2.5 mg per tablet Generic drug:  estrogen (conjugated)-medroxyPROGESTERone Your last dose was: Your next dose is:    
   
   
      
   
   
   
  
 SYNTHROID 175 mcg tablet Generic drug:  levothyroxine Your last dose was: Your next dose is: Take 112 mcg by mouth Daily (before breakfast). 112 mcg VITAMIN B-12 1,000 mcg sublingual tablet Generic drug:  cyanocobalamin Your last dose was: Your next dose is: Take 1,000 mcg by mouth daily. 1000 mcg VITAMIN D2 PO Your last dose was: Your next dose is: Take  by mouth.

## 2018-02-05 NOTE — IP AVS SNAPSHOT
Summary of Care Report The Summary of Care report has been created to help improve care coordination. Users with access to [employer] or [address] (Web-based application) may access additional patient information including the Discharge Summary. If you are not currently a [address] user and need more information, please call the number listed below in the [place] section and ask to be connected with Medical Records. Facility Information Name Address Phone [address] [address] [phone] Patient Information Patient Name Sex MEHUL Castro Dear ([id number]) Female 1965 Discharge Information Admitting Provider Service Area Unit Jennifer Ragland MD / [phone] Prudence Gonzalez / [phone] Discharge Provider Discharge Date/Time Discharge Disposition Destination (none) (none) (none) (none) Patient Language Language ENGLISH [13] Hospital Problems as of 2018  Reviewed: 2017  3:54 PM by Hollis Wyatt NP None Non-Hospital Problems as of 2018  Reviewed: 2017  3:54 PM by Hollis Wyatt NP Class Noted - Resolved Last Modified Active Problems Status post gastric bypass for obesity  10/2/2010 - Present 2012 by Aurelia De La Torre NP Entered by Som Millan LPN Overview Signed 10/2/2010 12:23 PM by Som Millan LPN  
   4/3/01  Morbid [facility]  10/2/2010 - Present 10/2/2010 by Som Millan LPN Entered by Som Millan LPN Sleep apnea  10/2/2010 - Present 10/2/2010 by Som Millan LPN Entered by Som Millan LPN Chronic low back pain  10/2/2010 - Present 10/2/2010 by Som Millan LPN   Entered by Som Millan LPN  
 Right knee pain  10/2/2010 - Present 10/2/2010 by Cordelia Matthews, LPN Entered by Cordelia Matthews LPN Pain, upper back  10/2/2010 - Present 10/2/2010 by Cordelia Matthews, BISMARKN Entered by Cordelia Matthews LPN Anemia  Unknown - Present 1/23/2012 by Laxmi Hidalgo LPN Entered by Laxmi Hidalgo LPN Acquired hypothyroidism  8/18/2017 - Present 8/18/2017 by Perry Cassidy NP Entered by Perry Cassidy NP You are allergic to the following Allergen Reactions Aspirin Other (comments) Due to GBP surgery Other Medication Other (comments) Steroids, can't take due to Gastric Bypass surgery Current Discharge Medication List  
  
ASK your doctor about these medications Dose & Instructions Dispensing Information Comments CALCIUM + D PO Take  by mouth. Refills:  0 MULTI-VITAMIN PO Take  by mouth. Refills:  0  
   
 omeprazole 40 mg capsule Commonly known as:  PRILOSEC Dose:  40 mg Take 40 mg by mouth daily. Refills:  0 PREMPRO 0.625-2.5 mg per tablet Generic drug:  estrogen (conjugated)-medroxyPROGESTERone Refills:  0  
   
 SYNTHROID 175 mcg tablet Generic drug:  levothyroxine Dose:  112 mcg Take 112 mcg by mouth Daily (before breakfast). Refills:  0  
   
 VITAMIN B-12 1,000 mcg sublingual tablet Generic drug:  cyanocobalamin Dose:  1000 mcg Take 1,000 mcg by mouth daily. Refills:  0  
   
 VITAMIN D2 PO Take  by mouth. Refills:  0 Surgery Information ID Date/Time Status Primary Surgeon All Procedures Location [id number] 2/5/2018 [id number] [place], [place] [address]gurdeep Baxter MD ESOPHAGEAL MANOMETRY 
ESOPHAGEAL MANOMETRY WITH IMPEDANCE [facility] Follow-up Information None Discharge Instructions Jeff Garcia [id number] 
1965 MANOMETRY DISCHARGE INSTRUCTION You may resume your regular diet as tolerated. You may resume your normal daily activities. If you develop a sore throat- throat lozenges or warm salt water gargles will help. Call your Physician if you have any complications or questions. [employer] Activation Thank you for requesting access to [employer]. Please follow the instructions below to securely access and download your online medical record. [employer] allows you to send messages to your doctor, view your test results, renew your prescriptions, schedule appointments, and more. How Do I Sign Up? 1. In your internet browser, go to [email or web address] 
2. Click on the First Time User? Click Here link in the Sign In box. You will be redirect to the New Member Sign Up page. 3. Enter your [employer] Access Code exactly as it appears below. You will not need to use this code after youve completed the sign-up process. If you do not sign up before the expiration date, you must request a new code. [employer] Access Code: Activation code not generated Current [employer] Status: Active (This is the date your [employer] access code will ) 4. Enter the last four digits of your Social Security Number (xxxx) and Date of Birth (mm/dd/yyyy) as indicated and click Submit. You will be taken to the next sign-up page. 5. Create a [employer] ID. This will be your [employer] login ID and cannot be changed, so think of one that is secure and easy to remember. 6. Create a [employer] password. You can change your password at any time. 7. Enter your Password Reset Question and Answer. This can be used at a later time if you forget your password. 8. Enter your e-mail address. You will receive e-mail notification when new information is available in [address] 9. Click Sign Up. You can now view and download portions of your medical record. 10. Click the Download Summary menu link to download a portable copy of your medical information. Additional Information If you have questions, please visit the Frequently Asked Questions section of the [employer] website at [email or web address] Remember, [employer] is NOT to be used for urgent needs. For medical emergencies, dial 911. Chart Review Routing History No Routing History on File

## 2018-02-05 NOTE — DISCHARGE INSTRUCTIONS
Amara Jacky  [id number]  1965      MANOMETRY DISCHARGE INSTRUCTION    You may resume your regular diet as tolerated. You may resume your normal daily activities. If you develop a sore throat- throat lozenges or warm salt water gargles will help. Call your Physician if you have any complications or questions. [employer] Activation    Thank you for requesting access to [employer] Please follow the instructions below to securely access and download your online medical record. [employer] allows you to send messages to your doctor, view your test results, renew your prescriptions, schedule appointments, and more. How Do I Sign Up? 1. In your internet browser, go to [email or web address]  2. Click on the First Time User? Click Here link in the Sign In box. You will be redirect to the New Member Sign Up page. 3. Enter your [employer] Access Code exactly as it appears below. You will not need to use this code after youve completed the sign-up process. If you do not sign up before the expiration date, you must request a new code. [employer] Access Code: Activation code not generated  Current [employer] Status: Active (This is the date your [employer] access code will )    4. Enter the last four digits of your Social Security Number (xxxx) and Date of Birth (mm/dd/yyyy) as indicated and click Submit. You will be taken to the next sign-up page. 5. Create a [employer] ID. This will be your [employer] login ID and cannot be changed, so think of one that is secure and easy to remember. 6. Create a [employer] password. You can change your password at any time. 7. Enter your Password Reset Question and Answer. This can be used at a later time if you forget your password. 8. Enter your e-mail address. You will receive e-mail notification when new information is available in [address] 9. Click Sign Up. You can now view and download portions of your medical record.   10. Click the Download Summary menu link to download a portable copy of your medical information. Additional Information    If you have questions, please visit the Frequently Asked Questions section of the [employer] website at [email or web address] Remember, [employer] is NOT to be used for urgent needs. For medical emergencies, dial 911.

## 2018-02-05 NOTE — IP AVS SNAPSHOT
[address] [facility] 
[phone] Patient: Reynaldo Hendrix MRN: [id number] :1965 About your hospitalization You were admitted on:  2018 You last received care in the:  [facility] ENDOSCOPY You were discharged on:  2018 Why you were hospitalized Your primary diagnosis was:  Not on File Follow-up Information None Your Scheduled Appointments   9:00 AM EST  
NUTRITION COUNSELING with Evert Amezcua RD [address] [address] [address]  
[phone] Discharge Orders None A check matty indicates which time of day the medication should be taken. My Medications ASK your doctor about these medications Instructions Each Dose to Equal  
 Morning Noon Evening Bedtime CALCIUM + D PO Your last dose was: Your next dose is: Take  by mouth. MULTI-VITAMIN PO Your last dose was: Your next dose is: Take  by mouth. omeprazole 40 mg capsule Commonly known as:  PRILOSEC Your last dose was: Your next dose is: Take 40 mg by mouth daily. 40 mg PREMPRO 0.625-2.5 mg per tablet Generic drug:  estrogen (conjugated)-medroxyPROGESTERone Your last dose was: Your next dose is:    
   
   
      
   
   
   
  
 SYNTHROID 175 mcg tablet Generic drug:  levothyroxine Your last dose was: Your next dose is: Take 112 mcg by mouth Daily (before breakfast). 112 mcg VITAMIN B-12 1,000 mcg sublingual tablet Generic drug:  cyanocobalamin Your last dose was: Your next dose is: Take 1,000 mcg by mouth daily. 1000 mcg VITAMIN D2 PO Your last dose was: Your next dose is: Take  by mouth. Discharge Instructions Kalie Granado [id number] 
1965 MANOMETRY DISCHARGE INSTRUCTION You may resume your regular diet as tolerated. You may resume your normal daily activities. If you develop a sore throat- throat lozenges or warm salt water gargles will help. Call your Physician if you have any complications or questions. [email or web address] Activation Thank you for requesting access to [email or web address]. Please follow the instructions below to securely access and download your online medical record. [email or web address] allows you to send messages to your doctor, view your test results, renew your prescriptions, schedule appointments, and more. How Do I Sign Up? 1. In your internet browser, go to [email or web address] World 
2. Click on the First Time User? Click Here link in the Sign In box. You will be redirect to the New Member Sign Up page. 3. Enter your [email or web address] Access Code exactly as it appears below. You will not need to use this code after youve completed the sign-up process. If you do not sign up before the expiration date, you must request a new code. [email or web address] Access Code: Activation code not generated Current [email or web address] Status: Active (This is the date your [email or web address] access code will ) 4. Enter the last four digits of your Social Security Number (xxxx) and Date of Birth (mm/dd/yyyy) as indicated and click Submit. You will be taken to the next sign-up page. 5. Create a [email or web address] ID. This will be your [email or web address] login ID and cannot be changed, so think of one that is secure and easy to remember. 6. Create a [email or web address] password. You can change your password at any time. 7. Enter your Password Reset Question and Answer. This can be used at a later time if you forget your password. 8. Enter your e-mail address.  You will receive e-mail notification when new information is available in Lenda. 9. Click Sign Up. You can now view and download portions of your medical record. 10. Click the Download Summary menu link to download a portable copy of your medical information. Additional Information If you have questions, please visit the Frequently Asked Questions section of the Lenda website at [email or web address] Remember, MyChart is NOT to be used for urgent needs. For medical emergencies, dial 911. Introducing [facility] SERVICES! Dear Bao Solitario: Thank you for requesting a Lenda account. Our records indicate that you already have an active Lenda account. You can access your account anytime at [email or web address] Did you know that you can access your hospital and ER discharge instructions at any time in Lenda? You can also review all of your test results from your hospital stay or ER visit. Additional Information If you have questions, please visit the Frequently Asked Questions section of the Lenda website at [email or web address] Remember, MyChart is NOT to be used for urgent needs. For medical emergencies, dial 911. Now available from your iPhone and Android! Providers Seen During Your Hospitalization Provider Specialty Primary office phone Donald Mccullough MD Gastroenterology [phone] Your Primary Care Physician (PCP) Primary Care Physician Office Phone Office Fax OTHER, PHYS ** None ** ** None ** You are allergic to the following Allergen Reactions Aspirin Other (comments) Due to GBP surgery Other Medication Other (comments) Steroids, can't take due to Gastric Bypass surgery Recent Documentation Height Weight Breastfeeding? BMI OB Status Smoking Status 1.499 m 87.5 kg No 38.98 kg/m2 Postmenopausal Former Smoker Emergency Contacts Name Discharge Info Relation Home Work Mobile Gio Ann DISCHARGE CAREGIVER [3] Friend [5] [phone] Patient Belongings The following personal items are in your possession at time of discharge: 
  Dental Appliances: None  Visual Aid: None Please provide this summary of care documentation to your next provider. Signatures-by signing, you are acknowledging that this After Visit Summary has been reviewed with you and you have received a copy. Patient Signature:  ____________________________________________________________ Date:  ____________________________________________________________  
  
[facility] Provider Signature:  ____________________________________________________________ Date:  ____________________________________________________________

## 2018-02-10 NOTE — PROCEDURES
[address]  [address]          Esophageal Manometry Procedure    Joan Mcwilliams  1965  [id number]      Procedure: Esophageal manometry    Indication: Dysphagia, history of gastric bypass    Pre-operative Diagnosis: see indication above    Post-operative Diagnosis: see findings below    : Rosmery Treviño MD    Referring Provider:  Harish Huang MD    Procedure Details     It was done at the endoscopy lab, the manometry catheter was advanced to the stomach then pressure measurements were obtained. I reviewed all the manometry results. Findings: (PLEASE REFER TO THE SCANNED REPORT FOR MORE DETAILS)                                 Lower esophageal sphincter study    LESP =  10.4 mmHg  complete relaxation                                Lower esophageal body study        Peristaltic contractions=  50%  Amplitudes  84.3  mmHg         Upper Esophageal body study      UESP=  22.2 mmHg                                     Complications:   None; patient tolerated the procedure well. Impression:    Minor disorder of peristalsis: Fragmented peristalsis based on [place] Classification     Recommendations:   Follow-up with Dr. Cee Perez    Signed By: Rosmery Treviño MD     2/10/2018  5:25 PM

## 2018-02-22 ENCOUNTER — CLINICAL SUPPORT (OUTPATIENT)
Dept: SURGERY | Age: 53
End: 2018-02-22

## 2018-02-22 VITALS — WEIGHT: 196 LBS | BODY MASS INDEX: 39.59 KG/M2

## 2018-02-22 DIAGNOSIS — E66.9 OBESITY (BMI 30-39.9): Primary | ICD-10-CM

## 2018-02-22 NOTE — PROGRESS NOTES
[address] [facility] Surgery at [facility]  Supervised Weight Loss     Date:   2018    Patient's Name: Nette Gorman  : 1965    Insurance:  [facility]          Session: 5 of    Surgery: Gastric Bypass Revision  Surgeon:  Sharan Lott M.D. Height: 60\"   Weight:    196      Lbs. BMI: 39   Pounds Lost since last month: 0               Pounds Gained since last month: 3#    Starting Weight: 194#   Previous Months Weight: 193#  Overall Pounds Lost: 0  Overall Pounds Gained: 2#    Other Pertinent Information: none    Smoking Status:  none  Alcohol Intake: none    I have reviewed with patient the guidelines of the supervised weight loss class. Patient understands the expectations of some weight loss during the weight loss trial.  Patient understands that weight gain could delay the process. I have also expressed to patient that classes need to be consecutive. Missing a class may subject patient to have to start their trial over. Patient has received this information in writing. Changes that patient has made since last month include:  Getting more protein, have been taking steroids d/t being sick this past month. Eating Habits and Behaviors  A nutrition and behavior education less and activity was completed by the patient specific to mindful eating behaviors, emotional eating and developing non-food coping strategies for emotional eating. We also reviewed the general diet principles for weight loss surgery. Their plate should be made up of 1/2 coming from non-starchy vegetables, 1/4 coming from lean meat, and 1/4 of their plate coming from carbohydrates, including fruits, starches, or milk. Emphasis was placed on the importance of eating 3 meals a day and aiming for 60 grams of protein per day. I educated the patient on limiting liquid calories and drinking only calorie-free, sugar-free and non-carbonated beverages.  We discussed the importance of drinking 64 ounces of fluid per day to prevent dehydration post-operatively. Patient's current diet habits include: eating 2-3 meals per day. Snacking on yogurt or fruit. Eating pretzels and crackers. Denies intake of sweets/desserts. Eating baked, grilled and broiled foods. Eating out is 1-3 times per week. Drinking 24 oz water, 24 oz unsweetened tea, 24 oz caffeine daily. Denies emotional and situational eating. Packing meals when away from home. Eating most meals at a table and takes 20 minutes to finish the meal. Reports lack of activity is biggest barrier to weight loss at this time. Physical Activity/Exercise  During class we discussed the importance of increasing daily physical activity and beginning to develop an exercise regimen/routine. We discussed that exercise is an important part of long term weight loss. Comments:  During class, I discussed with patient the importance of getting into an exercise routine. Patient is currently walking for 30-60 minutes daily for activity. Patient has been encouraged to maintain as tolerated. Behavior Modification       Comments: We discussed the importance of eating mindfully after weight loss surgery to prevent food intolerance and prevent weight regain. We talked about how to eat more mindfully and identify emotional eating triggers. Tips and recommendations for how to make these changes were provided. Patient was encouraged to keep a food journal and record what they were taking in daily. Overall Assessment: Patient demonstrates appropriate lifestyle changes in preparation for weight loss surgery. Appears some wt gain this past month may be r/t medications (steroids) d/t recent illness. Will continue to monitor as pt works to complete supervised weight loss requirements. Patient-Set Goals:   1. Nutrition - get more protein and veggies   2. Exercise - increase exercise  3.  Behavior -not eat when stressed, listen to body    Lawrence Oliver RD  2/22/2018

## 2018-02-23 ENCOUNTER — OFFICE VISIT (OUTPATIENT)
Dept: SURGERY | Age: 53
End: 2018-02-23

## 2018-02-23 VITALS
DIASTOLIC BLOOD PRESSURE: 79 MMHG | BODY MASS INDEX: 38.3 KG/M2 | TEMPERATURE: 98.1 F | RESPIRATION RATE: 20 BRPM | SYSTOLIC BLOOD PRESSURE: 113 MMHG | HEIGHT: 59 IN | WEIGHT: 190 LBS | OXYGEN SATURATION: 97 % | HEART RATE: 87 BPM

## 2018-02-23 DIAGNOSIS — K95.09 COMPLICATION OF GASTRIC BAND PROCEDURE: ICD-10-CM

## 2018-02-23 DIAGNOSIS — Z98.84 STATUS POST GASTRIC BYPASS FOR OBESITY: ICD-10-CM

## 2018-02-23 DIAGNOSIS — K21.9 GASTROESOPHAGEAL REFLUX DISEASE WITHOUT ESOPHAGITIS: Primary | ICD-10-CM

## 2018-02-23 RX ORDER — GABAPENTIN 300 MG/1
900 CAPSULE ORAL 3 TIMES DAILY
COMMUNITY
Start: 2018-02-07

## 2018-02-23 RX ORDER — ORPHENADRINE CITRATE 100 MG/1
TABLET, EXTENDED RELEASE ORAL 2 TIMES DAILY
COMMUNITY
Start: 2018-02-07

## 2018-02-23 RX ORDER — LEVOTHYROXINE SODIUM 100 UG/1
TABLET ORAL
Refills: 1 | COMMUNITY
Start: 2018-02-13

## 2018-02-23 RX ORDER — VENLAFAXINE HYDROCHLORIDE 150 MG/1
CAPSULE, EXTENDED RELEASE ORAL
Refills: 1 | COMMUNITY
Start: 2018-01-18

## 2018-02-23 NOTE — PROGRESS NOTES
1. Have you been to the ER, urgent care clinic since your last visit? Hospitalized since your last visit? No    2. Have you seen or consulted any other health care providers outside of the [address] [place] [address] since your last visit? Include any pap smears or colon screening.  No

## 2018-02-23 NOTE — PROGRESS NOTES
Banded ring divided gastric bypass with severe GERD - she experiences frequent regurgitation, heartburn and aspiration episodes at night during sleep. She keeps the [facility] elevated and avoids eating  At night. She awakens with hoarseness of her voice which slowly improves    Work up has revealed-  Silastic gastric ring intact with obstruction causing significant pouch dilatation  Elongated afferent blind limb syndrome  Possible hiatal hernia    Work up includes-                                     Lower esophageal sphincter study     LESP =  10.4 mmHg  complete relaxation                                 Lower esophageal body study           Peristaltic contractions=  50%  Amplitudes  84.3  mmHg                                                                          Upper Esophageal body study        UESP=  87.3 mmHg       Complications:   None; patient tolerated the procedure well.      Impression:    Minor disorder of peristalsis: Fragmented peristalsis based on [place] Classification      Impression  Severe GERD regurgitation and aspiration episodes after silastic ring band gastric bypass  Recommend band removal, pouch resection to restore normal pouch size and reduce gastric acid secretion  Hiatal hernia repair if found at the time of surgical exploration  Very long afferent blind limb likely contributing to GERD symptoms- recommend resection of this small bowel limb  Possible fundoplication-  Partial versus 360 degree wrap- concern that manometry shows fragmented peristalsis in esophagus but pressure amplitude appears good at 84 mm Hg- plan intra op decision regarding best approach    Patient is committed to moving forward due to the severity of her ongoing GERD / regurgitation/aspiration syndrome  She will complete RD visits soon and will move for insurance authorization to proceed in the near future    Have seriously discussed 3-5x increased risk in this setting regarding revisional surgery.   Patient knows that all complications of gastric bypass can occur after re-do gastric bypass and are at higher risk including bleed, infection, leak, blood clots, obstruction, intestinal complications, etc.  Patient knows the surgical procedure will be significantly longer and therefore carry more risk. Patient knows there is a higher risk of open conversion. Patient appears to understand and accept these risks and is asking us to move forward with authorization / performance of the revision procedure despite the complexity and the significant increased risk of all complications including mortality which was specifically mentioned in order to help the patient understand the seriousness of this decision.      More than 50% of this encounter was spent in direct counseling for this patient Counseling included topics such as the ongoing evaluation and treatment as well as options for future care. All questions have been answered in detail and the patient has expressed satisfaction regarding understanding of all this information. At least 25 minutes were spent in direct patient contact including more than 50% of the time spent directly counseling the patient as above during this encounter.

## 2018-02-23 NOTE — MR AVS SNAPSHOT
[address] [facility] [address] [id number] [phone] Patient: Genevieve Dallas MRN: [id number] :1965 Visit Information Date & Time Provider Department Dept. Phone Encounter #  
 2018  9:00 AM Charlotte Apgar, [address] [phone] [id number] Your Appointments 3/21/2018 11:00 AM  
NUTRITION COUNSELING with Anushka Long RD [address] [facility] ([facility]) Appt Note: supervised wt loss #6 [address] [facility]  [address] [facility] [address]  
[phone]  
  
   
 [address] [facility] [address] [facility] [address] [address] Upcoming Health Maintenance Date Due Hepatitis C Screening 1965 DTaP/Tdap/Td series (1 - Tdap) 10/17/1984 PAP AKA CERVICAL CYTOLOGY 10/17/1984 BREAST CANCER SCRN MAMMOGRAM 10/17/2013 FOBT Q 1 YEAR AGE 50-75 10/17/2013 Influenza Age 5 to Adult 2017 Allergies as of 2018  Review Complete On: 2018 By: Kleber Blackwood Severity Noted Reaction Type Reactions Aspirin  2012    Other (comments) Due to GBP surgery Other Medication  2012    Other (comments) Steroids, can't take due to Gastric Bypass surgery Current Immunizations  Never Reviewed No immunizations on file. Not reviewed this visit Vitals BP Pulse Temp Resp Height(growth percentile) Weight(growth percentile) 113/79 (BP 1 Location: Left arm, BP Patient Position: Sitting) 87 98.1 °F (36.7 °C) (Oral) 20 4' 11\" (1.499 m) 190 lb (86.2 kg) SpO2 BMI OB Status Smoking Status 97% 38.38 kg/m2 Postmenopausal Former Smoker Vitals History BMI and BSA Data Body Mass Index Body Surface Area  
 38.38 kg/m 2 1.89 m 2 Preferred Pharmacy Pharmacy Name Phone [id number] [address] Your Updated Medication List  
  
   
This list is accurate as of 2/23/18 11:15 AM.  Always use your most recent med list.  
  
  
  
  
 CALCIUM + D PO Take  by mouth.  
  
 gabapentin 300 mg capsule Commonly known as:  NEURONTIN  
  
 MULTI-VITAMIN PO Take  by mouth. omeprazole 40 mg capsule Commonly known as:  PRILOSEC Take 40 mg by mouth daily. orphenadrine citrate 100 mg sr tablet Commonly known as:  NORFLEX PREMPRO 0.625-2.5 mg per tablet Generic drug:  estrogen (conjugated)-medroxyPROGESTERone * SYNTHROID 175 mcg tablet Generic drug:  levothyroxine Take 112 mcg by mouth Daily (before breakfast). * levothyroxine 100 mcg tablet Commonly known as:  SYNTHROID  
TAKE 1 TABLET BY ORAL ROUTE EVERY DAY MONDAY TO SATURDAY, 1.5 TABLET SUNDAY  
  
 venlafaxine- mg capsule Commonly known as:  EFFEXOR-XR  
TAKE ONE CAPSULE BY MOUTH EVERY DAY  
  
 VITAMIN B-12 1,000 mcg sublingual tablet Generic drug:  cyanocobalamin Take 1,000 mcg by mouth daily. VITAMIN D2 PO Take  by mouth. * Notice: This list has 2 medication(s) that are the same as other medications prescribed for you. Read the directions carefully, and ask your doctor or other care provider to review them with you. Introducing [facility] & HEALTH SERVICES! Dear Tash Whitley: Thank you for requesting a [employer] account. Our records indicate that you already have an active [employer] account. You can access your account anytime at [email or web address] Did you know that you can access your hospital and ER discharge instructions at any time in Cat[employer]ult Health? You can also review all of your test results from your hospital stay or ER visit. Additional Information If you have questions, please visit the Frequently Asked Questions section of the [employer]ult [employer] website at [email or web address] Remember, [employer] is NOT to be used for urgent needs.  For medical emergencies, dial 911. Now available from your iPhone and Android! Please provide this summary of care documentation to your next provider. Your primary care clinician is listed as Phys Other. If you have any questions after today's visit, please call [phone].

## 2018-03-22 ENCOUNTER — CLINICAL SUPPORT (OUTPATIENT)
Dept: SURGERY | Age: 53
End: 2018-03-22

## 2018-03-22 VITALS — BODY MASS INDEX: 39.59 KG/M2 | WEIGHT: 196 LBS

## 2018-03-22 DIAGNOSIS — E66.9 OBESITY (BMI 30-39.9): Primary | ICD-10-CM

## 2018-03-22 NOTE — PROGRESS NOTES
[address] [facility] Surgery at [address]  Supervised Weight Loss     Date:   3/22/2018    Patient's Name: Rudy Nelson  : 1965    [place]urance:  [facility]          Session:   Surgery: Gastric Bypass Revision   Surgeon:  Donte Mercedes M.D. Height: 60\"   Weight:    196      Lbs. BMI: 39   Pounds Lost since last month: 0               Pounds Gained since last month: 0    Starting Weight: 194#   Previous Months Weight: 196#  Overall Pounds Lost: 0  Overall Pounds Gained: 2#    Other Pertinent Information: n/a     Smoking Status:  none  Alcohol Intake: none    I have reviewed with patient the guidelines of the supervised weight loss class. Patient understands the expectations of some weight loss during the weight loss trial.  Patient understands that weight gain could delay the process. I have also expressed to patient that classes need to be consecutive. Missing a class may subject patient to have to start their trial over. Patient has received this information in writing. Changes that patient has made since last month include:  Eating more protein, drinking less coffee. Eating Habits and Behaviors  A nutrition lesson specific to portion control was presented We discussed using the balanced plate method before surgery to practice portion control along with slowing down eating pace. Their plate should be made up of 1/2 coming from non-starchy vegetables, 1/4 coming from lean meat, and 1/4 of their plate coming from carbohydrates, including fruits, starches, or milk. We discussed measuring meals to 1/2 cup total per meal after surgery. Emphasis was placed on the importance of eating 3 meals a day and aiming for 60 grams of protein per day. I educated the patient on limiting liquid calories and drinking only calorie-free, sugar-free and non-carbonated beverages. We discussed the importance of drinking 64 ounces of fluid per day to prevent dehydration post-operatively. Patient's current diet habits include: eating 2-3 meals per day. Snacking on fruit and yogurt. Eating crackers 3 times per week d/t \"burning in my stomach\". Otherwise avoiding refined carbohydrates, sweets, desserts. Eating baked, grilled and broiled foods. Eating out is 1-3 times per week. Drinking 32 oz water, 24 oz unsweetened tea, 12 oz caffeine daily. Recently some stress eating d/t fiance being in the hospital. Denies situational eating. Packing meals when away from home. Eating most meals at a table and takes 20 minutes or more to finish the meal. Reports lack of activity is only barrier to weight loss at this time. Physical Activity/Exercise  During class we discussed the importance of increasing daily physical activity and beginning to develop an exercise regimen/routine. We discussed that exercise is an important part of long term weight loss. Comments:  During class, I discussed with patient the importance of getting into an exercise routine. Patient is currently walking for 30-45 minutes, 3-5 times per week for activity. Patient has been encouraged to maintain and increase as tolerated. Behavior Modification       Comments: We discussed the importance of eating mindfully after weight loss surgery to prevent food intolerance and prevent weight regain. We talked about how to eat more mindfully and identify emotional eating triggers. Tips and recommendations for how to make these changes were provided. Patient was encouraged to keep a food journal and record what they were taking in daily. Overall Assessment: Patient demonstrates appropriate lifestyle changes in preparation for weight loss surgery revision. Demonstrates good understanding of the general nutrition guidelines. There are no concerns from an understanding or compliance standpoint at this time.  We may do another month to see if she can get her weight back down to starting weight for insurance requirements, otherwise patient appears to be an appropriate candidate for surgery at this time. Patient-Set Goals:   1. Nutrition - more protein and balanced plate  2. Exercise - more walking, exercise at work  3.  Behavior -not to stress eat    Sd Forman, BRADEN  3/22/2018

## 2018-04-18 ENCOUNTER — CLINICAL SUPPORT (OUTPATIENT)
Dept: SURGERY | Age: 53
End: 2018-04-18

## 2018-04-18 VITALS — WEIGHT: 195 LBS | BODY MASS INDEX: 39.39 KG/M2

## 2018-04-18 DIAGNOSIS — E66.9 OBESITY (BMI 30-39.9): Primary | ICD-10-CM

## 2018-04-18 NOTE — PROGRESS NOTES
[address] [facility] Surgery at [facility]  Supervised Weight Loss     Date:   2018    Patient's Name: Saurabh Moses  : 1965    Insurance:  [facility]          Session:   Surgery: Gastric Bypass Revision   Surgeon:  Gustabo Proctor M.D. Height: 60\"  Weight:    195      Lbs. BMI: 39   Pounds Lost since last month: 1#               Pounds Gained since last month: 0    Starting Weight: 194#   Previous Months Weight: 196#  Overall Pounds Lost: 0  Overall Pounds Gained: 1#    Other Pertinent Information: Patient has demonstrated appropriate lifestyle changes during the 6 month program and demonstrates good understanding of nutrition guidelines. There are no concerns for this patient from a compliance stand point. Smoking Status:  none  Alcohol Intake: none    I have reviewed with patient the guidelines of the supervised weight loss class. Patient understands the expectations of some weight loss during the weight loss trial.  Patient understands that weight gain could delay the process. I have also expressed to patient that classes need to be consecutive. Missing a class may subject patient to have to start their trial over. Patient has received this information in writing. Changes that patient has made since last month include:  Continued high protein meal plan, more exercise, protein drinks. Eating Habits and Behaviors  A review of the general nutrition guidelines in preparation for weight loss surgery was provided. Patients were instructed that their plate should be made up 1/2 plate coming from non-starchy vegetables, 1/4 coming from lean meat, and 1/4 of their plate coming from carbohydrates, including fruits, starches, or milk. We discussed measuring meals to 1/2 cup total per meal after surgery. Emphasis was placed on the importance of eating 3 meals a day and aiming for 60 grams of protein per day.  I educated the patient on limiting liquid calories and drinking only calorie-free, sugar-free and non-carbonated beverages. We discussed the importance of drinking 64 ounces of fluid per day to prevent dehydration post-operatively. Patient's current diet habits include: eating 2-3 meals per day. Snacking on veggies. Avoiding refined carbohydrates, sweets, desserts. Eating baked, grilled and broiled foods. Eating out is 1-3 times per week. Drinking 32 oz water, 24 oz unsweetened tea, 24 oz caffeine. Denies emotional or situational eating. Packing meals when away from home. Eating most meals at a table and takes 20 minutes to finish the meal.         Physical Activity/Exercise  A lifestyle and behavior change discussion was provided specific to exercise. We discussed common barriers to exercise, ways to work around barriers and various ways to get started with exercise. We talked about the importance of increasing daily physical activity and beginning to develop an exercise regimen/routine. We discussed that exercise is an important part of long term weight loss after surgery. Comments:  During class, I discussed with patient the importance of getting into an exercise routine. Patient is currently exercising 3 times per week or more for activity. Patient has been encouraged to maintain and increase frequency, duration and/or intensity as tolerated. Behavior Modification       Comments: We discussed the importance of eating mindfully after weight loss surgery to prevent food intolerance and prevent weight regain. We talked about how to eat more mindfully and identify emotional eating triggers. Tips and recommendations for how to make these changes were provided. Patient was encouraged to keep a food journal and record what they were taking in daily.          Overall Assessment: Patient demonstrates appropriate lifestyle changes in preparation for weight loss surgery revision evidenced by reported changes and mostly weight maintenance during the 6 month weight loss program. Demonstrates good understanding of general nutrition guidelines and has complied with recommended changes. There are no reservations or concerns for this patient being an appropriate candidate for surgery at this time. Patient-Set Goals:   1. Nutrition - continue 60 grams protein per day   2. Exercise - exercising 3 times per week, add in more activity at work   3.  Behavior -read food labels more carefully    Flory Elizabeth RD  4/18/2018

## 2018-05-25 ENCOUNTER — HOSPITAL ENCOUNTER (OUTPATIENT)
Dept: PREADMISSION TESTING | Age: 53
Discharge: HOME | End: 2018-05-25
Payer: COMMERCIAL

## 2018-05-25 ENCOUNTER — OFFICE VISIT (OUTPATIENT)
Dept: SURGERY | Age: 53
End: 2018-05-25

## 2018-05-25 ENCOUNTER — HOSPITAL ENCOUNTER (OUTPATIENT)
Dept: GENERAL RADIOLOGY | Age: 53
Discharge: HOME | End: 2018-05-25
Payer: COMMERCIAL

## 2018-05-25 VITALS
TEMPERATURE: 98 F | HEIGHT: 59 IN | BODY MASS INDEX: 39.31 KG/M2 | SYSTOLIC BLOOD PRESSURE: 124 MMHG | HEART RATE: 76 BPM | WEIGHT: 195 LBS | DIASTOLIC BLOOD PRESSURE: 78 MMHG

## 2018-05-25 VITALS
RESPIRATION RATE: 20 BRPM | TEMPERATURE: 98 F | OXYGEN SATURATION: 97 % | WEIGHT: 195 LBS | HEART RATE: 71 BPM | SYSTOLIC BLOOD PRESSURE: 124 MMHG | DIASTOLIC BLOOD PRESSURE: 78 MMHG | HEIGHT: 59 IN | BODY MASS INDEX: 39.31 KG/M2

## 2018-05-25 VITALS
TEMPERATURE: 98 F | WEIGHT: 195 LBS | HEIGHT: 59 IN | DIASTOLIC BLOOD PRESSURE: 78 MMHG | SYSTOLIC BLOOD PRESSURE: 124 MMHG | RESPIRATION RATE: 20 BRPM | HEART RATE: 71 BPM | OXYGEN SATURATION: 97 % | BODY MASS INDEX: 39.31 KG/M2

## 2018-05-25 DIAGNOSIS — K21.9 GASTROESOPHAGEAL REFLUX DISEASE WITHOUT ESOPHAGITIS: ICD-10-CM

## 2018-05-25 DIAGNOSIS — Z01.818 PREOP GENERAL PHYSICAL EXAM: ICD-10-CM

## 2018-05-25 DIAGNOSIS — K95.09 COMPLICATION OF GASTRIC BAND PROCEDURE: ICD-10-CM

## 2018-05-25 DIAGNOSIS — Z98.84 STATUS POST GASTRIC BYPASS FOR OBESITY: ICD-10-CM

## 2018-05-25 DIAGNOSIS — E66.01 MORBID OBESITY (HCC): Primary | ICD-10-CM

## 2018-05-25 DIAGNOSIS — K21.9 GASTROESOPHAGEAL REFLUX DISEASE WITHOUT ESOPHAGITIS: Primary | ICD-10-CM

## 2018-05-25 LAB
ALBUMIN SERPL-MCNC: 3.7 G/DL (ref 3.5–5)
ALBUMIN/GLOB SERPL: 1.1 {RATIO} (ref 1.1–2.2)
ALP SERPL-CCNC: 73 U/L (ref 45–117)
ALT SERPL-CCNC: 23 U/L (ref 12–78)
ANION GAP SERPL CALC-SCNC: 8 MMOL/L (ref 5–15)
APTT PPP: 26.5 SEC (ref 22.1–32)
AST SERPL-CCNC: 19 U/L (ref 15–37)
ATRIAL RATE: 58 BPM
BASOPHILS # BLD: 0 K/UL (ref 0–0.1)
BASOPHILS NFR BLD: 0 % (ref 0–1)
BILIRUB SERPL-MCNC: 0.4 MG/DL (ref 0.2–1)
BUN SERPL-MCNC: 10 MG/DL (ref 6–20)
BUN/CREAT SERPL: 14 (ref 12–20)
CALCIUM SERPL-MCNC: 8.9 MG/DL (ref 8.5–10.1)
CALCULATED P AXIS, ECG09: 50 DEGREES
CALCULATED R AXIS, ECG10: 28 DEGREES
CALCULATED T AXIS, ECG11: 19 DEGREES
CHLORIDE SERPL-SCNC: 105 MMOL/L (ref 97–108)
CO2 SERPL-SCNC: 29 MMOL/L (ref 21–32)
CREAT SERPL-MCNC: 0.71 MG/DL (ref 0.55–1.02)
DIAGNOSIS, 93000: NORMAL
DIFFERENTIAL METHOD BLD: ABNORMAL
EOSINOPHIL # BLD: 0 K/UL (ref 0–0.4)
EOSINOPHIL NFR BLD: 1 % (ref 0–7)
ERYTHROCYTE [DISTWIDTH] IN BLOOD BY AUTOMATED COUNT: 13 % (ref 11.5–14.5)
GLOBULIN SER CALC-MCNC: 3.3 G/DL (ref 2–4)
GLUCOSE SERPL-MCNC: 95 MG/DL (ref 65–100)
HCT VFR BLD AUTO: 40.2 % (ref 35–47)
HGB BLD-MCNC: 12.6 G/DL (ref 11.5–16)
IMM GRANULOCYTES # BLD: 0 K/UL (ref 0–0.04)
IMM GRANULOCYTES NFR BLD AUTO: 1 % (ref 0–0.5)
INR PPP: 1 (ref 0.9–1.1)
LYMPHOCYTES # BLD: 2 K/UL (ref 0.8–3.5)
LYMPHOCYTES NFR BLD: 31 % (ref 12–49)
MCH RBC QN AUTO: 28.7 PG (ref 26–34)
MCHC RBC AUTO-ENTMCNC: 31.3 G/DL (ref 30–36.5)
MCV RBC AUTO: 91.6 FL (ref 80–99)
MONOCYTES # BLD: 0.4 K/UL (ref 0–1)
MONOCYTES NFR BLD: 6 % (ref 5–13)
NEUTS SEG # BLD: 3.9 K/UL (ref 1.8–8)
NEUTS SEG NFR BLD: 61 % (ref 32–75)
NRBC # BLD: 0 K/UL (ref 0–0.01)
NRBC BLD-RTO: 0 PER 100 WBC
P-R INTERVAL, ECG05: 156 MS
PLATELET # BLD AUTO: 246 K/UL (ref 150–400)
PMV BLD AUTO: 9.9 FL (ref 8.9–12.9)
POTASSIUM SERPL-SCNC: 4.4 MMOL/L (ref 3.5–5.1)
PROT SERPL-MCNC: 7 G/DL (ref 6.4–8.2)
PROTHROMBIN TIME: 10.2 SEC (ref 9–11.1)
Q-T INTERVAL, ECG07: 428 MS
QRS DURATION, ECG06: 74 MS
QTC CALCULATION (BEZET), ECG08: 420 MS
RBC # BLD AUTO: 4.39 M/UL (ref 3.8–5.2)
SODIUM SERPL-SCNC: 142 MMOL/L (ref 136–145)
THERAPEUTIC RANGE,PTTT: NORMAL SECS (ref 58–77)
VENTRICULAR RATE, ECG03: 58 BPM
WBC # BLD AUTO: 6.4 K/UL (ref 3.6–11)

## 2018-05-25 PROCEDURE — 80053 COMPREHEN METABOLIC PANEL: CPT | Performed by: SURGERY

## 2018-05-25 PROCEDURE — 71046 X-RAY EXAM CHEST 2 VIEWS: CPT

## 2018-05-25 PROCEDURE — 85610 PROTHROMBIN TIME: CPT | Performed by: SURGERY

## 2018-05-25 PROCEDURE — 85730 THROMBOPLASTIN TIME PARTIAL: CPT | Performed by: SURGERY

## 2018-05-25 PROCEDURE — 85025 COMPLETE CBC W/AUTO DIFF WBC: CPT | Performed by: SURGERY

## 2018-05-25 PROCEDURE — 93005 ELECTROCARDIOGRAM TRACING: CPT

## 2018-05-25 RX ORDER — RANITIDINE HCL 75 MG
150 TABLET ORAL
COMMUNITY
End: 2018-08-15 | Stop reason: ALTCHOICE

## 2018-05-25 RX ORDER — ONDANSETRON 4 MG/1
4 TABLET, ORALLY DISINTEGRATING ORAL
Qty: 30 TAB | Refills: 0 | Status: ON HOLD | OUTPATIENT
Start: 2018-05-25 | End: 2018-06-28

## 2018-05-25 NOTE — PROGRESS NOTES
Patient seen to discuss risks and benefits of gastric bypass revision planned for 6/27/18  Patient has an enlarged gastric pouch, intact gastric marlex mesh band causing obstruction, long blind afferent limb, hiatal herniation of pouch. Patient does suffer from severe GERD and maladaptive eating contributing to weight regain with minimal satiety due to failure of restriction. Patient has asked us to consider revision of her previous procedure to try and restore restriction  Patient understands that we can not restore dumping or other physiologic effects of gastric bypass surgery. Have seriously discussed 3-5x increased risk in this setting regarding revisional surgery and particularly increased due to previous surgery with scarring and adhesions/ thickened tissues etc.  Patient and  know that all complications of gastric bypass can occur after re-do gastric bypass and are at higher risk including bleed, infection, leak, blood clots, obstruction, intestinal complications, etc.  Patient knows the surgical procedure will be significantly longer and therefore carry more risk. Patient knows there is a higher risk of open conversion. I have previously drawn the patient pictures of the anatomy and discussed the various surgical methods we may use to try and restore the restrictive function of the procedure and relieve her ongoing GERD including pouch volume reduction, redo gastrojejunostomy, removal of portion of the pouch with marlex mesh band, resection of the long afferent limb, hiatal repair with possible bio mesh, etc.     Patient appears to understand and accept these risks and is asking us to move forward with authorization / performance of the revision procedure despite the complexity and the significant increased risk of all complications including mortality which was specifically mentioned in order to help the patient understand the seriousness of this decision.      Representative image from UGI series       Primary area of gastric pouch is about 10 x 10 cm (see above)   Estimated volume therefore is > 300 cc (5 cm radius, V= r to the 3rd power)    More than 50% of this encounter was spent in direct counseling for this patient Counseling included topics such as the ongoing evaluation and treatment as well as options for future care. All questions have been answered in detail and the patient has expressed satisfaction regarding understanding of all this information. At least 25 minutes were spent in direct patient contact including more than 50% of the time spent directly counseling the patient as above during this encounter.

## 2018-05-25 NOTE — PROGRESS NOTES
1. Have you been to the ER, urgent care clinic since your last visit? Hospitalized since your last visit? No    2. Have you seen or consulted any other health care providers outside of the [address] [place] [address] since your last visit? Include any pap smears or colon screening.  Ortho for pinched nerve in neck

## 2018-05-25 NOTE — MR AVS SNAPSHOT
[address] [facility] [address] [facility] [place] [address] [address] 
[phone] Patient: Oliva Boyd MRN: [id number] :1965 Visit Information Date & Time Provider Department Dept. Phone Encounter #  
 2018 11:20 AM Shirin Soares NP [facility] [phone] [id number] Your Appointments 2018  1:00 PM  
ESTABLISHED PATIENT with MD Allie Chacon [phone] [facility] Appt Note: *sign consents for surgery 18*  
 [address] [facility] [address]  
[address] [place] [address] Upcoming Health Maintenance Date Due Hepatitis C Screening 1965 DTaP/Tdap/Td series (1 - Tdap) 10/17/1984 PAP AKA CERVICAL CYTOLOGY 10/17/1984 BREAST CANCER SCRN MAMMOGRAM 10/17/2013 FOBT Q 1 YEAR AGE 50-75 10/17/2013 Influenza Age 5 to Adult 2018 Allergies as of 2018  Review Complete On: 2018 By: Shirin Soares NP Severity Noted Reaction Type Reactions Aspirin  2012    Other (comments) Due to GBP surgery Other Medication  2012    Other (comments) Steroids, can't take due to Gastric Bypass surgery Current Immunizations  Never Reviewed No immunizations on file. Not reviewed this visit You Were Diagnosed With   
  
 Codes Comments Morbid obesity ([facility] [address]    -  Primary ICD-10-CM: E66.01 
ICD-9-CM: 278.01 Gastroesophageal reflux disease without esophagitis     ICD-10-CM: K21.9 ICD-9-CM: 530.81 Status post gastric bypass for obesity     ICD-10-CM: Z98.84 ICD-9-CM: V45.86 BMI 39.0-39.9,adult     ICD-10-CM: K66.31 ICD-9-CM: V85.39 Vitals BP Pulse Temp Resp Height(growth percentile) Weight(growth percentile) 124/78 71 98 °F (36.7 °C) 20 4' 11.25\" (1.505 m) 195 lb (88.5 kg) SpO2 BMI OB Status Smoking Status 97% 39.05 kg/m2 Postmenopausal Former Smoker Vitals History BMI and BSA Data Body Mass Index Body Surface Area 39.05 kg/m 2 1.92 m 2 Preferred Pharmacy Pharmacy Name Phone [facility]Y #[id number] HillmbNatanael martinez Your Updated Medication List  
  
   
This list is accurate as of 5/25/18 12:15 PM.  Always use your most recent med list.  
  
  
  
  
 CALCIUM + D PO Take 1 Tab by mouth daily. gabapentin 300 mg capsule Commonly known as:  NEURONTIN  
900 mg three (3) times daily. levothyroxine 100 mcg tablet Commonly known as:  SYNTHROID  
TAKE 1 TABLET BY ORAL ROUTE EVERY DAY MONDAY TO SATURDAY, 1.5 TABLET SUNDAY MULTI-VITAMIN PO Take  by mouth. omeprazole 40 mg capsule Commonly known as:  PRILOSEC Take 40 mg by mouth every morning. ondansetron 4 mg disintegrating tablet Commonly known as:  ZOFRAN ODT Take 1 Tab by mouth every six (6) hours as needed for Nausea. orphenadrine citrate 100 mg sr tablet Commonly known as:  NORFLEX  
two (2) times a day. PREMPRO 0.625-2.5 mg per tablet Generic drug:  estrogen (conjugated)-medroxyPROGESTERone 1 Tab daily. venlafaxine- mg capsule Commonly known as:  EFFEXOR-XR  
TAKE ONE CAPSULE BY MOUTH EVERY MORNING  
  
 VITAMIN B-12 1,000 mcg sublingual tablet Generic drug:  cyanocobalamin Take 1,000 mcg by mouth daily. VITAMIN D2 PO Take 2,000 Units by mouth two (2) times a day. ZANTAC 75 75 mg tablet Generic drug:  raNITIdine Take 150 mg by mouth nightly. Prescriptions Sent to Pharmacy Refills  
 ondansetron (ZOFRAN ODT) 4 mg disintegrating tablet 0 Sig: Take 1 Tab by mouth every six (6) hours as needed for Nausea.   
 Class: Normal  
 Pharmacy: [facility]/pharmacy #[id number] [address] AT [facility] #: [phone] Route: Oral  
  
Patient Instructions Start liver shrinking diet on June 13, 2018. Introducing [facility]! Dear Cyndie Montoya: Thank you for requesting a [employer] account. Our records indicate that you already have an active [employer] account. You can access your account anytime at [email or web address] Did you know that you can access your hospital and ER discharge instructions at any time in [employer]? You can also review all of your test results from your hospital stay or ER visit. Additional Information If you have questions, please visit the Frequently Asked Questions section of the [employer] website at [email or web address] Remember, [employer] is NOT to be used for urgent needs. For medical emergencies, dial 911. Now available from your iPhone and Android! Please provide this summary of care documentation to your next provider. Your primary care clinician is listed as Phys Other. If you have any questions after today's visit, please call [phone].

## 2018-05-25 NOTE — PERIOP NOTES
PATIENT GIVEN SURGICAL SITE INFECTION FAQ HANDOUT AND HAND WASHING TIP SHEET. PREOP INSTRUCTIONS REVIEWED AND PATIENT VERBALIZES UNDERSTANDING OF INSTRUCTIONS.   PATIENT HAS BEEN GIVEN THE OPPORTUNITY TO ASK QUESTIONS.g wipes c instructions were given to the pt

## 2018-05-25 NOTE — PROGRESS NOTES
1. Have you been to the ER, urgent care clinic since your last visit? Hospitalized since your last visit? No    2. Have you seen or consulted any other health care providers outside of the [address] [place] [address] since your last visit? Include any pap smears or colon screening.  No

## 2018-05-25 NOTE — MR AVS SNAPSHOT
[address] [facility] [id number] [phone] Patient: Chiki De Souza MRN: [id number] :1965 Visit Information Date & Time Provider Department Dept. Phone Encounter #  
 2018  1:00 PM Allie Jorgensen 137 [phone] [id number] Upcoming Health Maintenance Date Due Hepatitis C Screening 1965 DTaP/Tdap/Td series (1 - Tdap) 10/17/1984 PAP AKA CERVICAL CYTOLOGY 10/17/1984 BREAST CANCER SCRN MAMMOGRAM 10/17/2013 FOBT Q 1 YEAR AGE 50-75 10/17/2013 Influenza Age 5 to Adult 2018 Allergies as of 2018  Review Complete On: 2018 By: Amber Blackwood Severity Noted Reaction Type Reactions Aspirin  2012    Other (comments) Due to GBP surgery Other Medication  2012    Other (comments) Steroids, can't take due to Gastric Bypass surgery Current Immunizations  Never Reviewed No immunizations on file. Not reviewed this visit You Were Diagnosed With   
  
 Codes Comments Gastroesophageal reflux disease without esophagitis    -  Primary ICD-10-CM: K21.9 ICD-9-CM: 530.81 Complication of gastric band procedure     ICD-10-CM: K95.09 
ICD-9-CM: 539.09 BMI 39.0-39.9,adult     ICD-10-CM: D35.34 ICD-9-CM: V85.39 Vitals BP Pulse Temp Resp Height(growth percentile) Weight(growth percentile) 124/78 (BP 1 Location: Left arm, BP Patient Position: Sitting) 71 98 °F (36.7 °C) (Oral) 20 4' 11.25\" (1.505 m) 195 lb (88.5 kg) SpO2 BMI OB Status Smoking Status 97% 39.05 kg/m2 Postmenopausal Former Smoker BMI and BSA Data Body Mass Index Body Surface Area 39.05 kg/m 2 1.92 m 2 Preferred Pharmacy Pharmacy Name Phone [facility][id number] Natanael Garcia [address] Your Updated Medication List  
  
   
 This list is accurate as of 5/25/18  2:05 PM.  Always use your most recent med list.  
  
  
  
  
 CALCIUM + D PO Take 1 Tab by mouth daily. gabapentin 300 mg capsule Commonly known as:  NEURONTIN  
900 mg three (3) times daily. levothyroxine 100 mcg tablet Commonly known as:  SYNTHROID  
TAKE 1 TABLET BY ORAL ROUTE EVERY DAY MONDAY TO SATURDAY, 1.5 TABLET SUNDAY MULTI-VITAMIN PO Take  by mouth. omeprazole 40 mg capsule Commonly known as:  PRILOSEC Take 40 mg by mouth every morning. ondansetron 4 mg disintegrating tablet Commonly known as:  ZOFRAN ODT Take 1 Tab by mouth every six (6) hours as needed for Nausea. orphenadrine citrate 100 mg sr tablet Commonly known as:  NORFLEX  
two (2) times a day. PREMPRO 0.625-2.5 mg per tablet Generic drug:  estrogen (conjugated)-medroxyPROGESTERone 1 Tab daily. venlafaxine- mg capsule Commonly known as:  EFFEXOR-XR  
TAKE ONE CAPSULE BY MOUTH EVERY MORNING  
  
 VITAMIN B-12 1,000 mcg sublingual tablet Generic drug:  cyanocobalamin Take 1,000 mcg by mouth daily. VITAMIN D2 PO Take 2,000 Units by mouth two (2) times a day. ZANTAC 75 75 mg tablet Generic drug:  raNITIdine Take 150 mg by mouth nightly. Introducing [facility]! Dear Anna Bryant: Thank you for requesting a [employer] account. Our records indicate that you already have an active [employer] account. You can access your account anytime at [email or web address]. [email or web address] Company Did you know that you can access your hospital and ER discharge instructions at any time in Darkstrand? You can also review all of your test results from your hospital stay or ER visit. Additional Information If you have questions, please visit the Frequently Asked Questions section of the [employer] website at [email or web address] Remember, [employer] is NOT to be used for urgent needs.  For medical emergencies, dial 911. Now available from your iPhone and Android! Please provide this summary of care documentation to your next provider. Your primary care clinician is listed as Phys Other. If you have any questions after today's visit, please call [phone].

## 2018-05-30 ENCOUNTER — PATIENT MESSAGE (OUTPATIENT)
Dept: SURGERY | Age: 53
End: 2018-05-30

## 2018-05-31 ENCOUNTER — TELEPHONE (OUTPATIENT)
Dept: SURGERY | Age: 53
End: 2018-05-31

## 2018-06-01 NOTE — TELEPHONE ENCOUNTER
Spoke with patient and took information on guest that will be staying in the [address] [place] [address],[address]

## 2018-06-01 NOTE — TELEPHONE ENCOUNTER
Dr. Tristan Males Nurse [facility]. Application . submitted.

## 2018-06-27 ENCOUNTER — ANESTHESIA EVENT (OUTPATIENT)
Dept: SURGERY | Age: 53
DRG: 909 | End: 2018-06-27
Payer: COMMERCIAL

## 2018-06-27 ENCOUNTER — ANESTHESIA (OUTPATIENT)
Dept: SURGERY | Age: 53
DRG: 909 | End: 2018-06-27
Payer: COMMERCIAL

## 2018-06-27 ENCOUNTER — HOSPITAL ENCOUNTER (INPATIENT)
Age: 53
LOS: 1 days | Discharge: HOME | DRG: 909 | End: 2018-06-28
Attending: SURGERY | Admitting: SURGERY
Payer: COMMERCIAL

## 2018-06-27 DIAGNOSIS — K21.9 GASTROESOPHAGEAL REFLUX DISEASE WITHOUT ESOPHAGITIS: Primary | ICD-10-CM

## 2018-06-27 PROCEDURE — 77030020747 HC TU INSUF ENDOSC TELE -A: Performed by: SURGERY

## 2018-06-27 PROCEDURE — 77030009956 HC RELD ENDOSTCH COVD -B: Performed by: SURGERY

## 2018-06-27 PROCEDURE — 74011250636 HC RX REV CODE- 250/636: Performed by: ANESTHESIOLOGY

## 2018-06-27 PROCEDURE — 77030027743 HC APPL F/HEMSTAT BARD -B: Performed by: SURGERY

## 2018-06-27 PROCEDURE — 74011250636 HC RX REV CODE- 250/636

## 2018-06-27 PROCEDURE — 77030034701 HC DSCRTR CRDLS U/S DISP COVD -F: Performed by: SURGERY

## 2018-06-27 PROCEDURE — C1781 MESH (IMPLANTABLE): HCPCS | Performed by: SURGERY

## 2018-06-27 PROCEDURE — 0DJ08ZZ INSPECTION OF UPPER INTESTINAL TRACT, VIA NATURAL OR ARTIFICIAL OPENING ENDOSCOPIC: ICD-10-PCS | Performed by: SURGERY

## 2018-06-27 PROCEDURE — 77030039266 HC ADH SKN EXOFIN S2SG -A: Performed by: SURGERY

## 2018-06-27 PROCEDURE — 77030008684 HC TU ET CUF COVD -B: Performed by: ANESTHESIOLOGY

## 2018-06-27 PROCEDURE — 77030026438 HC STYL ET INTUB CARD -A: Performed by: ANESTHESIOLOGY

## 2018-06-27 PROCEDURE — 77030035048 HC TRCR ENDOSC OPTCL COVD -B: Performed by: SURGERY

## 2018-06-27 PROCEDURE — 77030032490 HC SLV COMPR SCD KNE COVD -B: Performed by: SURGERY

## 2018-06-27 PROCEDURE — 76210000017 HC OR PH I REC 1.5 TO 2 HR: Performed by: SURGERY

## 2018-06-27 PROCEDURE — 0DP64CZ REMOVAL OF EXTRALUMINAL DEVICE FROM STOMACH, PERCUTANEOUS ENDOSCOPIC APPROACH: ICD-10-PCS | Performed by: SURGERY

## 2018-06-27 PROCEDURE — 76010000134 HC OR TIME 3.5 TO 4 HR: Performed by: SURGERY

## 2018-06-27 PROCEDURE — 74011250636 HC RX REV CODE- 250/636: Performed by: NURSE PRACTITIONER

## 2018-06-27 PROCEDURE — 74011258636 HC RX REV CODE- 258/636: Performed by: SURGERY

## 2018-06-27 PROCEDURE — 74011000250 HC RX REV CODE- 250

## 2018-06-27 PROCEDURE — 77030031139 HC SUT VCRL2 J&J -A: Performed by: SURGERY

## 2018-06-27 PROCEDURE — 77030016151 HC PROTCTR LNS DFOG COVD -B: Performed by: SURGERY

## 2018-06-27 PROCEDURE — 77030013079 HC BLNKT BAIR HGGR 3M -A: Performed by: ANESTHESIOLOGY

## 2018-06-27 PROCEDURE — 77030009957 HC RELD ENDOSTCH COVD -C: Performed by: SURGERY

## 2018-06-27 PROCEDURE — 77030035051: Performed by: SURGERY

## 2018-06-27 PROCEDURE — 77030035030 HC NDL INSUF VERES 150ML DISP COVD -B: Performed by: SURGERY

## 2018-06-27 PROCEDURE — 77030020263 HC SOL INJ SOD CL0.9% LFCR 1000ML: Performed by: SURGERY

## 2018-06-27 PROCEDURE — 74011250636 HC RX REV CODE- 250/636: Performed by: SURGERY

## 2018-06-27 PROCEDURE — 77030035045 HC TRCR ENDOSC VRSPRT BLDLSS COVD -B: Performed by: SURGERY

## 2018-06-27 PROCEDURE — 77030008756 HC TU IRR SUC STRY -B: Performed by: SURGERY

## 2018-06-27 PROCEDURE — 77030002933 HC SUT MCRYL J&J -A: Performed by: SURGERY

## 2018-06-27 PROCEDURE — 88307 TISSUE EXAM BY PATHOLOGIST: CPT | Performed by: SURGERY

## 2018-06-27 PROCEDURE — 74011000250 HC RX REV CODE- 250: Performed by: ANESTHESIOLOGY

## 2018-06-27 PROCEDURE — 77030011640 HC PAD GRND REM COVD -A: Performed by: SURGERY

## 2018-06-27 PROCEDURE — 77030038160 HC SHR COAG HARM J&J -F: Performed by: SURGERY

## 2018-06-27 PROCEDURE — 77030018836 HC SOL IRR NACL ICUM -A: Performed by: SURGERY

## 2018-06-27 PROCEDURE — 0BUT4JZ SUPPLEMENT DIAPHRAGM WITH SYNTHETIC SUBSTITUTE, PERCUTANEOUS ENDOSCOPIC APPROACH: ICD-10-PCS | Performed by: SURGERY

## 2018-06-27 PROCEDURE — 0DNU4ZZ RELEASE OMENTUM, PERCUTANEOUS ENDOSCOPIC APPROACH: ICD-10-PCS | Performed by: SURGERY

## 2018-06-27 PROCEDURE — 77030032060 HC PWDR HEMSTAT ARISTA ASRB 3GM BARD -C: Performed by: SURGERY

## 2018-06-27 PROCEDURE — 65660000000 HC RM CCU STEPDOWN

## 2018-06-27 PROCEDURE — 77030032435: Performed by: SURGERY

## 2018-06-27 PROCEDURE — 77030037032 HC INSRT SCIS CLICKLLINE DISP STOR -B: Performed by: SURGERY

## 2018-06-27 PROCEDURE — 0DQ64ZZ REPAIR STOMACH, PERCUTANEOUS ENDOSCOPIC APPROACH: ICD-10-PCS | Performed by: SURGERY

## 2018-06-27 PROCEDURE — 76060000038 HC ANESTHESIA 3.5 TO 4 HR: Performed by: SURGERY

## 2018-06-27 PROCEDURE — 74011000250 HC RX REV CODE- 250: Performed by: SURGERY

## 2018-06-27 DEVICE — BIO-A TISSUE REINFORCEMENT 7CMX10CM
Type: IMPLANTABLE DEVICE | Site: ABDOMEN | Status: FUNCTIONAL
Brand: GORE BIO-A TISSUE REINFORCEMENT

## 2018-06-27 RX ORDER — SODIUM CHLORIDE 0.9 % (FLUSH) 0.9 %
5-10 SYRINGE (ML) INJECTION AS NEEDED
Status: DISCONTINUED | OUTPATIENT
Start: 2018-06-27 | End: 2018-06-27 | Stop reason: HOSPADM

## 2018-06-27 RX ORDER — MIDAZOLAM HYDROCHLORIDE 1 MG/ML
0.5 INJECTION, SOLUTION INTRAMUSCULAR; INTRAVENOUS
Status: DISCONTINUED | OUTPATIENT
Start: 2018-06-27 | End: 2018-06-27

## 2018-06-27 RX ORDER — MIDAZOLAM HYDROCHLORIDE 1 MG/ML
1 INJECTION, SOLUTION INTRAMUSCULAR; INTRAVENOUS AS NEEDED
Status: DISCONTINUED | OUTPATIENT
Start: 2018-06-27 | End: 2018-06-27 | Stop reason: HOSPADM

## 2018-06-27 RX ORDER — MORPHINE SULFATE 10 MG/ML
2 INJECTION, SOLUTION INTRAMUSCULAR; INTRAVENOUS
Status: DISCONTINUED | OUTPATIENT
Start: 2018-06-27 | End: 2018-06-27

## 2018-06-27 RX ORDER — SODIUM CHLORIDE 0.9 % (FLUSH) 0.9 %
5-10 SYRINGE (ML) INJECTION AS NEEDED
Status: DISCONTINUED | OUTPATIENT
Start: 2018-06-27 | End: 2018-06-27

## 2018-06-27 RX ORDER — SODIUM CHLORIDE 0.9 % (FLUSH) 0.9 %
5-10 SYRINGE (ML) INJECTION AS NEEDED
Status: DISCONTINUED | OUTPATIENT
Start: 2018-06-27 | End: 2018-06-28 | Stop reason: HOSPADM

## 2018-06-27 RX ORDER — GLYCOPYRROLATE 0.2 MG/ML
INJECTION INTRAMUSCULAR; INTRAVENOUS AS NEEDED
Status: DISCONTINUED | OUTPATIENT
Start: 2018-06-27 | End: 2018-06-27 | Stop reason: HOSPADM

## 2018-06-27 RX ORDER — FAMOTIDINE 10 MG/ML
20 INJECTION INTRAVENOUS ONCE
Status: DISCONTINUED | OUTPATIENT
Start: 2018-06-27 | End: 2018-06-27 | Stop reason: SDUPTHER

## 2018-06-27 RX ORDER — LIDOCAINE HYDROCHLORIDE 10 MG/ML
0.1 INJECTION, SOLUTION EPIDURAL; INFILTRATION; INTRACAUDAL; PERINEURAL AS NEEDED
Status: DISCONTINUED | OUTPATIENT
Start: 2018-06-27 | End: 2018-06-27 | Stop reason: HOSPADM

## 2018-06-27 RX ORDER — CEFAZOLIN SODIUM/WATER 2 G/20 ML
2 SYRINGE (ML) INTRAVENOUS EVERY 8 HOURS
Status: COMPLETED | OUTPATIENT
Start: 2018-06-27 | End: 2018-06-27

## 2018-06-27 RX ORDER — OXYCODONE HYDROCHLORIDE 5 MG/1
5 TABLET ORAL AS NEEDED
Status: DISCONTINUED | OUTPATIENT
Start: 2018-06-27 | End: 2018-06-27

## 2018-06-27 RX ORDER — BUPIVACAINE HYDROCHLORIDE 5 MG/ML
INJECTION, SOLUTION EPIDURAL; INTRACAUDAL AS NEEDED
Status: DISCONTINUED | OUTPATIENT
Start: 2018-06-27 | End: 2018-06-27 | Stop reason: HOSPADM

## 2018-06-27 RX ORDER — SODIUM CHLORIDE 0.9 % (FLUSH) 0.9 %
5-10 SYRINGE (ML) INJECTION EVERY 8 HOURS
Status: DISCONTINUED | OUTPATIENT
Start: 2018-06-27 | End: 2018-06-28 | Stop reason: HOSPADM

## 2018-06-27 RX ORDER — DIPHENHYDRAMINE HYDROCHLORIDE 50 MG/ML
25 INJECTION, SOLUTION INTRAMUSCULAR; INTRAVENOUS
Status: DISCONTINUED | OUTPATIENT
Start: 2018-06-27 | End: 2018-06-28

## 2018-06-27 RX ORDER — DIPHENHYDRAMINE HYDROCHLORIDE 50 MG/ML
12.5 INJECTION, SOLUTION INTRAMUSCULAR; INTRAVENOUS AS NEEDED
Status: COMPLETED | OUTPATIENT
Start: 2018-06-27 | End: 2018-06-27

## 2018-06-27 RX ORDER — LIDOCAINE HYDROCHLORIDE 20 MG/ML
INJECTION, SOLUTION EPIDURAL; INFILTRATION; INTRACAUDAL; PERINEURAL AS NEEDED
Status: DISCONTINUED | OUTPATIENT
Start: 2018-06-27 | End: 2018-06-27 | Stop reason: HOSPADM

## 2018-06-27 RX ORDER — DEXTROSE, SODIUM CHLORIDE, SODIUM LACTATE, POTASSIUM CHLORIDE, AND CALCIUM CHLORIDE 5; .6; .31; .03; .02 G/100ML; G/100ML; G/100ML; G/100ML; G/100ML
125 INJECTION, SOLUTION INTRAVENOUS CONTINUOUS
Status: DISPENSED | OUTPATIENT
Start: 2018-06-27 | End: 2018-06-28

## 2018-06-27 RX ORDER — SUCCINYLCHOLINE CHLORIDE 20 MG/ML
INJECTION INTRAMUSCULAR; INTRAVENOUS AS NEEDED
Status: DISCONTINUED | OUTPATIENT
Start: 2018-06-27 | End: 2018-06-27 | Stop reason: HOSPADM

## 2018-06-27 RX ORDER — ONDANSETRON 4 MG/1
4 TABLET, ORALLY DISINTEGRATING ORAL
Status: DISCONTINUED | OUTPATIENT
Start: 2018-06-27 | End: 2018-06-28 | Stop reason: HOSPADM

## 2018-06-27 RX ORDER — HYDROMORPHONE HYDROCHLORIDE 1 MG/ML
0.25 INJECTION, SOLUTION INTRAMUSCULAR; INTRAVENOUS; SUBCUTANEOUS
Status: DISCONTINUED | OUTPATIENT
Start: 2018-06-27 | End: 2018-06-28

## 2018-06-27 RX ORDER — FENTANYL CITRATE 50 UG/ML
25 INJECTION, SOLUTION INTRAMUSCULAR; INTRAVENOUS
Status: COMPLETED | OUTPATIENT
Start: 2018-06-27 | End: 2018-06-27

## 2018-06-27 RX ORDER — ROCURONIUM BROMIDE 10 MG/ML
INJECTION, SOLUTION INTRAVENOUS AS NEEDED
Status: DISCONTINUED | OUTPATIENT
Start: 2018-06-27 | End: 2018-06-27 | Stop reason: HOSPADM

## 2018-06-27 RX ORDER — FENTANYL CITRATE 50 UG/ML
50 INJECTION, SOLUTION INTRAMUSCULAR; INTRAVENOUS AS NEEDED
Status: DISCONTINUED | OUTPATIENT
Start: 2018-06-27 | End: 2018-06-27 | Stop reason: HOSPADM

## 2018-06-27 RX ORDER — SODIUM CHLORIDE, SODIUM LACTATE, POTASSIUM CHLORIDE, CALCIUM CHLORIDE 600; 310; 30; 20 MG/100ML; MG/100ML; MG/100ML; MG/100ML
25 INJECTION, SOLUTION INTRAVENOUS CONTINUOUS
Status: DISCONTINUED | OUTPATIENT
Start: 2018-06-27 | End: 2018-06-27 | Stop reason: HOSPADM

## 2018-06-27 RX ORDER — SODIUM CHLORIDE 9 MG/ML
25 INJECTION, SOLUTION INTRAVENOUS CONTINUOUS
Status: DISCONTINUED | OUTPATIENT
Start: 2018-06-27 | End: 2018-06-27 | Stop reason: HOSPADM

## 2018-06-27 RX ORDER — HYDROMORPHONE HYDROCHLORIDE 1 MG/ML
INJECTION, SOLUTION INTRAMUSCULAR; INTRAVENOUS; SUBCUTANEOUS
Status: COMPLETED
Start: 2018-06-27 | End: 2018-06-27

## 2018-06-27 RX ORDER — ONDANSETRON 2 MG/ML
INJECTION INTRAMUSCULAR; INTRAVENOUS AS NEEDED
Status: DISCONTINUED | OUTPATIENT
Start: 2018-06-27 | End: 2018-06-27 | Stop reason: HOSPADM

## 2018-06-27 RX ORDER — NALOXONE HYDROCHLORIDE 0.4 MG/ML
0.4 INJECTION, SOLUTION INTRAMUSCULAR; INTRAVENOUS; SUBCUTANEOUS AS NEEDED
Status: DISCONTINUED | OUTPATIENT
Start: 2018-06-27 | End: 2018-06-28 | Stop reason: HOSPADM

## 2018-06-27 RX ORDER — MIDAZOLAM HYDROCHLORIDE 1 MG/ML
INJECTION, SOLUTION INTRAMUSCULAR; INTRAVENOUS AS NEEDED
Status: DISCONTINUED | OUTPATIENT
Start: 2018-06-27 | End: 2018-06-27 | Stop reason: HOSPADM

## 2018-06-27 RX ORDER — SODIUM CHLORIDE, SODIUM LACTATE, POTASSIUM CHLORIDE, CALCIUM CHLORIDE 600; 310; 30; 20 MG/100ML; MG/100ML; MG/100ML; MG/100ML
125 INJECTION, SOLUTION INTRAVENOUS CONTINUOUS
Status: DISCONTINUED | OUTPATIENT
Start: 2018-06-27 | End: 2018-06-27

## 2018-06-27 RX ORDER — SODIUM CHLORIDE 0.9 % (FLUSH) 0.9 %
5-10 SYRINGE (ML) INJECTION EVERY 8 HOURS
Status: DISCONTINUED | OUTPATIENT
Start: 2018-06-27 | End: 2018-06-27 | Stop reason: HOSPADM

## 2018-06-27 RX ORDER — MORPHINE SULFATE 2 MG/ML
3-5 INJECTION, SOLUTION INTRAMUSCULAR; INTRAVENOUS
Status: DISCONTINUED | OUTPATIENT
Start: 2018-06-27 | End: 2018-06-28 | Stop reason: SDUPTHER

## 2018-06-27 RX ORDER — NEOSTIGMINE METHYLSULFATE 1 MG/ML
INJECTION INTRAVENOUS AS NEEDED
Status: DISCONTINUED | OUTPATIENT
Start: 2018-06-27 | End: 2018-06-27 | Stop reason: HOSPADM

## 2018-06-27 RX ORDER — HYDROMORPHONE HYDROCHLORIDE 2 MG/ML
INJECTION, SOLUTION INTRAMUSCULAR; INTRAVENOUS; SUBCUTANEOUS AS NEEDED
Status: DISCONTINUED | OUTPATIENT
Start: 2018-06-27 | End: 2018-06-27 | Stop reason: HOSPADM

## 2018-06-27 RX ORDER — HYDROCODONE BITARTRATE AND ACETAMINOPHEN 5; 325 MG/1; MG/1
1 TABLET ORAL
Status: DISCONTINUED | OUTPATIENT
Start: 2018-06-27 | End: 2018-06-28

## 2018-06-27 RX ORDER — PROPOFOL 10 MG/ML
INJECTION, EMULSION INTRAVENOUS AS NEEDED
Status: DISCONTINUED | OUTPATIENT
Start: 2018-06-27 | End: 2018-06-27 | Stop reason: HOSPADM

## 2018-06-27 RX ORDER — DIPHENHYDRAMINE HYDROCHLORIDE 50 MG/ML
12.5 INJECTION, SOLUTION INTRAMUSCULAR; INTRAVENOUS
Status: COMPLETED | OUTPATIENT
Start: 2018-06-27 | End: 2018-06-27

## 2018-06-27 RX ORDER — ONDANSETRON 2 MG/ML
4 INJECTION INTRAMUSCULAR; INTRAVENOUS AS NEEDED
Status: DISCONTINUED | OUTPATIENT
Start: 2018-06-27 | End: 2018-06-27

## 2018-06-27 RX ORDER — DIPHENHYDRAMINE HYDROCHLORIDE 50 MG/ML
12.5 INJECTION, SOLUTION INTRAMUSCULAR; INTRAVENOUS ONCE
Status: COMPLETED | OUTPATIENT
Start: 2018-06-27 | End: 2018-06-27

## 2018-06-27 RX ORDER — SODIUM CHLORIDE, SODIUM LACTATE, POTASSIUM CHLORIDE, CALCIUM CHLORIDE 600; 310; 30; 20 MG/100ML; MG/100ML; MG/100ML; MG/100ML
INJECTION, SOLUTION INTRAVENOUS
Status: DISCONTINUED | OUTPATIENT
Start: 2018-06-27 | End: 2018-06-27 | Stop reason: HOSPADM

## 2018-06-27 RX ORDER — DEXAMETHASONE SODIUM PHOSPHATE 4 MG/ML
INJECTION, SOLUTION INTRA-ARTICULAR; INTRALESIONAL; INTRAMUSCULAR; INTRAVENOUS; SOFT TISSUE AS NEEDED
Status: DISCONTINUED | OUTPATIENT
Start: 2018-06-27 | End: 2018-06-27 | Stop reason: HOSPADM

## 2018-06-27 RX ORDER — FENTANYL CITRATE 50 UG/ML
INJECTION, SOLUTION INTRAMUSCULAR; INTRAVENOUS AS NEEDED
Status: DISCONTINUED | OUTPATIENT
Start: 2018-06-27 | End: 2018-06-27 | Stop reason: HOSPADM

## 2018-06-27 RX ORDER — CEFAZOLIN SODIUM 1 G/3ML
INJECTION, POWDER, FOR SOLUTION INTRAMUSCULAR; INTRAVENOUS AS NEEDED
Status: DISCONTINUED | OUTPATIENT
Start: 2018-06-27 | End: 2018-06-27 | Stop reason: HOSPADM

## 2018-06-27 RX ADMIN — PROPOFOL 170 MG: 10 INJECTION, EMULSION INTRAVENOUS at 07:41

## 2018-06-27 RX ADMIN — ONDANSETRON 4 MG: 2 INJECTION INTRAMUSCULAR; INTRAVENOUS at 11:12

## 2018-06-27 RX ADMIN — HYDROMORPHONE HYDROCHLORIDE 0.5 MG: 2 INJECTION, SOLUTION INTRAMUSCULAR; INTRAVENOUS; SUBCUTANEOUS at 10:34

## 2018-06-27 RX ADMIN — LIDOCAINE HYDROCHLORIDE 80 MG: 20 INJECTION, SOLUTION EPIDURAL; INFILTRATION; INTRACAUDAL; PERINEURAL at 07:41

## 2018-06-27 RX ADMIN — ROCURONIUM BROMIDE 45 MG: 10 INJECTION, SOLUTION INTRAVENOUS at 07:50

## 2018-06-27 RX ADMIN — FENTANYL CITRATE 100 MCG: 50 INJECTION, SOLUTION INTRAMUSCULAR; INTRAVENOUS at 07:41

## 2018-06-27 RX ADMIN — DIPHENHYDRAMINE HYDROCHLORIDE 12.5 MG: 50 INJECTION, SOLUTION INTRAMUSCULAR; INTRAVENOUS at 12:54

## 2018-06-27 RX ADMIN — CEFAZOLIN SODIUM 2 G: 1 INJECTION, POWDER, FOR SOLUTION INTRAMUSCULAR; INTRAVENOUS at 07:57

## 2018-06-27 RX ADMIN — ONDANSETRON 4 MG: 2 INJECTION INTRAMUSCULAR; INTRAVENOUS at 11:53

## 2018-06-27 RX ADMIN — Medication 10 ML: at 22:00

## 2018-06-27 RX ADMIN — SODIUM CHLORIDE, SODIUM LACTATE, POTASSIUM CHLORIDE, CALCIUM CHLORIDE: 600; 310; 30; 20 INJECTION, SOLUTION INTRAVENOUS at 10:00

## 2018-06-27 RX ADMIN — ROCURONIUM BROMIDE 5 MG: 10 INJECTION, SOLUTION INTRAVENOUS at 07:41

## 2018-06-27 RX ADMIN — GLYCOPYRROLATE 0.4 MG: 0.2 INJECTION INTRAMUSCULAR; INTRAVENOUS at 11:13

## 2018-06-27 RX ADMIN — FENTANYL CITRATE 50 MCG: 50 INJECTION, SOLUTION INTRAMUSCULAR; INTRAVENOUS at 08:14

## 2018-06-27 RX ADMIN — Medication 2 G: at 15:58

## 2018-06-27 RX ADMIN — MIDAZOLAM HYDROCHLORIDE 2 MG: 1 INJECTION, SOLUTION INTRAMUSCULAR; INTRAVENOUS at 07:36

## 2018-06-27 RX ADMIN — FENTANYL CITRATE 50 MCG: 50 INJECTION, SOLUTION INTRAMUSCULAR; INTRAVENOUS at 09:56

## 2018-06-27 RX ADMIN — DIPHENHYDRAMINE HYDROCHLORIDE 12.5 MG: 50 INJECTION, SOLUTION INTRAMUSCULAR; INTRAVENOUS at 12:21

## 2018-06-27 RX ADMIN — FENTANYL CITRATE 25 MCG: 50 INJECTION, SOLUTION INTRAMUSCULAR; INTRAVENOUS at 12:11

## 2018-06-27 RX ADMIN — DIPHENHYDRAMINE HYDROCHLORIDE 12.5 MG: 50 INJECTION, SOLUTION INTRAMUSCULAR; INTRAVENOUS at 13:09

## 2018-06-27 RX ADMIN — HYDROMORPHONE HYDROCHLORIDE 0.25 MG: 1 INJECTION, SOLUTION INTRAMUSCULAR; INTRAVENOUS; SUBCUTANEOUS at 13:55

## 2018-06-27 RX ADMIN — SODIUM CHLORIDE, SODIUM LACTATE, POTASSIUM CHLORIDE, CALCIUM CHLORIDE: 600; 310; 30; 20 INJECTION, SOLUTION INTRAVENOUS at 07:36

## 2018-06-27 RX ADMIN — NEOSTIGMINE METHYLSULFATE 3 MG: 1 INJECTION INTRAVENOUS at 11:13

## 2018-06-27 RX ADMIN — Medication 2 G: at 23:23

## 2018-06-27 RX ADMIN — SODIUM CHLORIDE, SODIUM LACTATE, POTASSIUM CHLORIDE, AND CALCIUM CHLORIDE 25 ML/HR: 600; 310; 30; 20 INJECTION, SOLUTION INTRAVENOUS at 06:37

## 2018-06-27 RX ADMIN — SODIUM CHLORIDE 20 MG: 9 INJECTION INTRAMUSCULAR; INTRAVENOUS; SUBCUTANEOUS at 07:16

## 2018-06-27 RX ADMIN — DIPHENHYDRAMINE HYDROCHLORIDE 25 MG: 50 INJECTION, SOLUTION INTRAMUSCULAR; INTRAVENOUS at 17:40

## 2018-06-27 RX ADMIN — HYDROMORPHONE HYDROCHLORIDE 0.5 MG: 2 INJECTION, SOLUTION INTRAMUSCULAR; INTRAVENOUS; SUBCUTANEOUS at 09:10

## 2018-06-27 RX ADMIN — FENTANYL CITRATE 25 MCG: 50 INJECTION, SOLUTION INTRAMUSCULAR; INTRAVENOUS at 12:32

## 2018-06-27 RX ADMIN — ROCURONIUM BROMIDE 20 MG: 10 INJECTION, SOLUTION INTRAVENOUS at 09:47

## 2018-06-27 RX ADMIN — SUCCINYLCHOLINE CHLORIDE 100 MG: 20 INJECTION INTRAMUSCULAR; INTRAVENOUS at 07:41

## 2018-06-27 RX ADMIN — MORPHINE SULFATE 3 MG: 2 INJECTION, SOLUTION INTRAMUSCULAR; INTRAVENOUS at 15:03

## 2018-06-27 RX ADMIN — SODIUM CHLORIDE, SODIUM LACTATE, POTASSIUM CHLORIDE, CALCIUM CHLORIDE, AND DEXTROSE MONOHYDRATE 125 ML/HR: 600; 310; 30; 20; 5 INJECTION, SOLUTION INTRAVENOUS at 12:53

## 2018-06-27 RX ADMIN — FENTANYL CITRATE 25 MCG: 50 INJECTION, SOLUTION INTRAMUSCULAR; INTRAVENOUS at 11:56

## 2018-06-27 RX ADMIN — MORPHINE SULFATE 3 MG: 2 INJECTION, SOLUTION INTRAMUSCULAR; INTRAVENOUS at 20:52

## 2018-06-27 RX ADMIN — FENTANYL CITRATE 25 MCG: 50 INJECTION, SOLUTION INTRAMUSCULAR; INTRAVENOUS at 12:05

## 2018-06-27 RX ADMIN — DEXAMETHASONE SODIUM PHOSPHATE 4 MG: 4 INJECTION, SOLUTION INTRA-ARTICULAR; INTRALESIONAL; INTRAMUSCULAR; INTRAVENOUS; SOFT TISSUE at 07:52

## 2018-06-27 RX ADMIN — DIPHENHYDRAMINE HYDROCHLORIDE 12.5 MG: 50 INJECTION, SOLUTION INTRAMUSCULAR; INTRAVENOUS at 13:27

## 2018-06-27 NOTE — ANESTHESIA PREPROCEDURE EVALUATION
Anesthetic History   No history of anesthetic complications            Review of Systems / Medical History  Patient summary reviewed, nursing notes reviewed and pertinent labs reviewed    Pulmonary  Within defined limits                 Neuro/Psych   Within defined limits           Cardiovascular  Within defined limits                Exercise tolerance: >4 METS     GI/Hepatic/Renal     GERD: poorly controlled           Endo/Other      Hypothyroidism  Obesity, arthritis and anemia     Other Findings              Physical Exam    Airway  Mallampati: I  TM Distance: > 6 cm  Neck ROM: normal range of motion   Mouth opening: Normal     Cardiovascular  Regular rate and rhythm,  S1 and S2 normal,  no murmur, click, rub, or gallop             Dental  No notable dental hx       Pulmonary  Breath sounds clear to auscultation               Abdominal  GI exam deferred       Other Findings            Anesthetic Plan    ASA: 2  Anesthesia type: general          Induction: Intravenous  Anesthetic plan and risks discussed with: Patient

## 2018-06-27 NOTE — OP NOTES
Preop Dx Gastric band stricture with obstruction and hiatal hernia causing GERD s/p banded gastric bypass procedure  Postop Dx same  Surgeon Emilie Busch. Qasim Krishnan MD  Assistant  JUSTINO Andujar  The PA's assistance was requested and deemed to be essential due to the difficult and complex nature of this procedure and clear need to have an assistant with advanced surgical skills to perform adhesiolysis of very dense adhesions and to assist in the laparoscopic procedure including clamping the intestine for EGD and retracting bowel loops in addition to assistance with the planned fundoplication and/or gastrectomy. Procedure performed: Laparoscopic excision of marlex mesh band to relieve obstruction, hiatal hernia repair with biologic mesh, Hill gastropexy      Extensive lysis adhesions > 2 hours of OR time to accomplish      Intra-op EGD   Indications for procedure GERD due to stricture from banded gastric bypass due to mesh band   Significant findings  Extensive lysis of adhesions and dissection required over 2 hours of surgical time, gastric band > 50% anterior removed         After reviewing the history, physical exam and relevent findings, consent was verified. The risks, benefits, complications, treatment options, and expected outcomes were discussed with the patient. The possibilities of reaction to medication, pulmonary aspiration, perforation of viscus, bleeding, recurrent infection, the need for additional procedures, failure to diagnose a condition, the possible need to convert to an open procedure, and creating a complication requiring transfusion or operation were discussed with the patient. The patient and family concurred with the proposed plan, giving informed consent. Complication risk of 3-5 fold higher due to the complex nature of revision surgery was accepted by patient who asked us to proceed with surgery. Detailed description of procedure:   The patient was taken to the operating room, sequential compression devices were placed, intravenous antibiotics were administered and general endotracheal anesthesia was administered. Time out procedure was performed during which all parties agreed to the proposed planned operation. The abdomen was then prepped and draped in the usual sterile fashion. lnitial access incision was made in subcostal area with veres needle insertion without difficulty for insufflation of more than 3 liters of CO2 followed by insertion of optical viewing 5 mm trocar under direct vision using 5-0degree scope. Once the abdominal cavity was accessed, we insufflated to 15mmHg and verified there was no evidence of injury upon entry. We then switched over to a 5-45 degree scope and surveyed the abdomen. Dense adhesions were present. The omentum was adhesed to the previous midline abdominal incision and was taken down with harmonic. A plane was ultimately developed between the left lateral segment of the liver and the distal stomach. A supraumbilical incision was made for a 5mm trocar followed by subxiphoid incision was then made through which a 5 trocar was advanced under direct visualization. An additional right sided 12 mm trocar was placed under direct visualization as was a 5mm in the left abdomen. The patient was then placed in reverse Trendelenburg. The liver retraction system was placed after extensive dissection to develop an appropriate tissue plane. Ongoing adhesiolysis continued with blunt and harmonic dissection. We ultimately were able to identify the caudate lobe of the liver and then the right rafia. This dissection required much longer than the usual time required to expose the anatomy. The hiatus was dissected and the herniated portion of the gastric pouch brought down into the abdomen with gentle traction. The hiatus was moderately dilated and appeared to be intact posteriorly. Dissection along the angle of His exposed the left rafia well.       We then tediously dissected the distal pouch until we found the mesh band which we were able to mobilize off the gastric wall and transect. We then excised in both directions such that the entire anterior band was removed to relieve the stricture / obstruction. At least 3-4 cm of the entire band length was excised. We tried to mobilize the gastric fundus but it was densely adherent to the spleen and would not mobilize to come beneath the esophagus. Therefore we decided to do a Hill gastropexy to the median arcuate ligament instead. Hiatal repair was performed posteriorlly with 2 endostitch 2-0 surgidac sutures wtih a 36 Fr bougie inserted under direct vision by anesthesia. Bio-A biologic mesh was then placed and sutured into position in several locations to prevent it from moving. We then sutured the proximal stomach posteriorly to the diaphragmatc ligament in order to complete the Hill gastropexy and anchor the stomach in the abdomen. Finally upper flexible endoscopy was accomplished to inspect and test the pouch which was found to be hemostatic patent and with no air leak despite significant tension placed with distension. The Z line was now at 37 cm and the GJ outlet was non obstructed and found at 44 cm. Next, the left upper quadrant was inspected. There had been some oozing but after detailed inspection there was no evidence of any ongoing bleeding. We irrigated and noted to be clear. We then used Prema for additional hemostasis. The pieces of mesh specimens were removed with no dilatation of the incision site. .   Pneumoinsufflation was then vented through the trocar sites. The trocars were then removed,     Incisions were anesthetized with local anesthesia, rendered hemostatic and closed with 4-0 Monocryl and Dermabond. The patient tolerated the procedure well and was taken to the [id number] [place] [address] Unit in stable condition.  Instrument, sponge, and needle counts were reported as correct. Edmond Limon.  Moshe Vega MD

## 2018-06-27 NOTE — INTERVAL H&P NOTE
H&P Update:  Constantine Cisneros was seen and examined. History and physical has been reviewed. The patient has been examined.  There have been no significant clinical changes since the completion of the originally dated History and Physical.    Signed By: Brittney Villarreal MD     June 27, 2018 7:36 AM

## 2018-06-27 NOTE — ROUTINE PROCESS
Patient: Nazanin Mccarthy MRN: [id number]  SSN: [id number]   YOB: 1965  Age: 45 y.o. Sex: female     Patient is status post Procedure(s):  LAPAROSCOPIC REMOVAL SILASTIC GASTRIC BAND, LAPAROSCOPIC EXTENSIVE LYSIS OF ADHESION,LAPAROSCOPIC HIATAL HERNIA REPAIR WITH MESH, EGD   ESOPHAGOGASTRODUODENOSCOPY (EGD).     Surgeon(s) and Role:     * Wero Dunlap MD - Primary                      Peripheral IV 06/27/18 Left Hand (Active)   Dressing Status New 6/27/2018  6:36 AM   Dressing Type Transparent 6/27/2018  6:36 AM   Hub Color/Line Status Infusing 6/27/2018  6:36 AM                           Dressing/Packing:  Wound Abdomen-DRESSING TYPE: Topical skin adhesive/glue (4 TROCARS SITES) (06/27/18 1130)  Splint/Cast:  ]

## 2018-06-27 NOTE — IP AVS SNAPSHOT
Summary of Care Report The Summary of Care report has been created to help improve care coordination. Users with access to [employer] or [address] (Web-based application) may access additional patient information including the Discharge Summary. If you are not currently a [address] user and need more information, please call the number listed below in the [place] [address] section and ask to be connected with Medical Records. Facility Information Name Address Phone [address] [place] [id number] [phone] Patient Information Patient Name Sex  Fernando Hernandez ([id number]) Female 1965 Discharge Information Admitting Provider Service Area Unit Renetta Zapien MD / [facility] 28 4 Surg/Bariatrics / [phone] Discharge Provider Discharge Date/Time Discharge Disposition Destination (none) 2018 Afternoon (Pending) AHR (none) Patient Language Language ENGLISH [13] Hospital Problems as of 2018  Reviewed: 2018  5:56 AM by Nancy Restrepo MD  
  
  
  
 Class Noted - Resolved Last Modified POA Active Problems GERD (gastroesophageal reflux disease)  2018 - Present 2018 by Renetta Zapien MD Unknown Entered by Renetta Zapien MD  
  
Non-Hospital Problems as of 2018  Reviewed: 2018  5:56 AM by Nancy Restrepo MD  
  
  
  
 Class Noted - Resolved Last Modified Active Problems Status post gastric bypass for obesity  10/2/2010 - Present 2012 by Mayi Garces NP Entered by David Henry LPN Overview Signed 10/2/2010 12:23 PM by David Henry LPN  
   00  Morbid obesity ([facility] [address]  10/2/2010 - Present 10/2/2010 by David Henry LPN   Entered by David Henry LPN  
 Sleep apnea  10/2/2010 - Present 10/2/2010 by Georgiana Chapa, LPN Entered by Georgiana Chapa, LPN Chronic low back pain  10/2/2010 - Present 10/2/2010 by Georgiana Chapa, LPN Entered by Georgiana Chapa, LPN Right knee pain  10/2/2010 - Present 10/2/2010 by Georgiana Chapa, LPN Entered by Georgiana Chapa, LPN Pain, upper back  10/2/2010 - Present 10/2/2010 by Georgiana Chapa, LPN Entered by Georgiana Chapa, LPN Anemia  Unknown - Present 1/23/2012 by Burton Holley, LPN Entered by Burton Holley, LPN Acquired hypothyroidism  8/18/2017 - Present 8/18/2017 by Olegario Enriquez NP Entered by Olegario Enirquez NP You are allergic to the following Allergen Reactions Aspirin Other (comments) Due to GBP surgery Other Medication Other (comments) Steroids, can't take due to Gastric Bypass surgery Current Discharge Medication List  
  
START taking these medications Dose & Instructions Dispensing Information Comments HYDROcodone-acetaminophen 5-325 mg per tablet Commonly known as:  Bhavik Pare Dose:  1-2 Tab Take 1-2 Tabs by mouth every four (4) hours as needed. Max Daily Amount: 12 Tabs. Quantity:  50 Tab Refills:  0 CONTINUE these medications which have CHANGED Dose & Instructions Dispensing Information Comments  
 ondansetron 4 mg disintegrating tablet Commonly known as:  ZOFRAN ODT What changed:  when to take this Dose:  4 mg Take 1 Tab by mouth every eight (8) hours as needed for Nausea. Quantity:  30 Tab Refills:  0 CONTINUE these medications which have NOT CHANGED Dose & Instructions Dispensing Information Comments CALCIUM + D PO Dose:  1 Tab Take 1 Tab by mouth daily. Refills:  0  
   
 gabapentin 300 mg capsule Commonly known as:  NEURONTIN Dose:  900 mg  
900 mg three (3) times daily. Refills:  0  
   
 levothyroxine 100 mcg tablet Commonly known as:  SYNTHROID  
 TAKE 1 TABLET BY ORAL ROUTE EVERY DAY MONDAY TO SATURDAY, 1.5 TABLET SUNDAY Refills:  1 MULTI-VITAMIN PO Take  by mouth. Refills:  0  
   
 omeprazole 40 mg capsule Commonly known as:  PRILOSEC Dose:  40 mg Take 40 mg by mouth every morning. Refills:  0  
   
 orphenadrine citrate 100 mg sr tablet Commonly known as:  NORFLEX  
 two (2) times a day. Refills:  0 PREMPRO 0.625-2.5 mg per tablet Generic drug:  estrogen (conjugated)-medroxyPROGESTERone Dose:  1 Tab 1 Tab daily. Refills:  0  
   
 venlafaxine- mg capsule Commonly known as:  EFFEXOR-XR  
 TAKE ONE CAPSULE BY MOUTH EVERY MORNING Refills:  1 VITAMIN B-12 1,000 mcg sublingual tablet Generic drug:  cyanocobalamin Dose:  1000 mcg Take 1,000 mcg by mouth daily. Refills:  0  
   
 VITAMIN D2 PO Dose:  2000 Units Take 2,000 Units by mouth two (2) times a day. Refills:  0  
   
 ZANTAC 75 75 mg tablet Generic drug:  raNITIdine Dose:  150 mg Take 150 mg by mouth nightly. Refills:  0 Surgery Information ID Date/Time Status Primary Surgeon All Procedures Location [id number] 6/27/2018 0730 Unposted Ximena Arnold MD LAPAROSCOPIC REMOVAL SILASTIC GASTRIC BAND, LAPAROSCOPIC EXTENSIVE LYSIS OF ADHESION,LAPAROSCOPIC HIATAL HERNIA REPAIR WITH MESH, EGD  
ESOPHAGOGASTRODUODENOSCOPY (EGD) [facility] MAIN OR Follow-up Information Follow up With Details Comments Contact Info Reginald Ledesma MD   Patient can only remember the practice name and not the physician Discharge Instructions Surgical Discharge Instructions June 27 2018 Procedure: Procedure(s): LAPAROSCOPIC REMOVAL OF MARLEX MESH  BAND, LAPAROSCOPIC EXTENSIVE LYSIS OF ADHESION,LAPAROSCOPIC HIATAL HERNIA REPAIR WITH MESH, EGD  
ESOPHAGOGASTRODUODENOSCOPY (EGD) You have  a 2 week follow-up appointment. Future Appointments Date Time Provider Hasmukh Do 7/10/2018 8:20 AM Praful Claudio NP [facility] SCHED  
7/25/2018 9:20 AM ELMA Mccarthy  
8/15/2018 9:20 AM ELMA Mccarthy Office address is: [address] [place] [address] 
[phone] Post Operative Surgical Instructions: After your surgery, it is normal to feel weak and tired for a few  days after you return home. If you had laparoscopic surgery, you may also have pain in your shoulder for about 24 hours. · For a laparoscopic surgery, most people can go back to work or their normal routine in 1 to 2 weeks, but it may take longer, depending on the type of work you do. This care sheet gives you a general idea about how long it will take for you to recover. However, each person recovers at a different pace. Follow the steps below to get better as quickly as possible. How can you care for yourself at home? Activity · Rest when you feel tired. Getting enough sleep will help you recover. · Try to walk each day. Start out by walking a little more than you did the day before. Gradually increase the amount you walk. Walking boosts blood flow and helps prevent pneumonia and constipation. · For about 2 to 4 weeks, avoid lifting anything that would make you strain. This may include a child, heavy grocery bags and milk containers, a heavy briefcase or backpack, cat litter or dog food bags, or a vacuum . · Avoid strenuous activities, such as biking, jogging, weightlifting, and aerobic exercise, until your doctor says it is okay. · You may shower. Mike Zuñiga the incisions dry. Do not take a bath for the first 2 weeks, or until your doctor tells you it is okay. · You may drive when you are no longer taking pain medicine and can quickly move your foot from the gas pedal to the brake.  You must also be able to sit comfortably for a long period of time, even if you do not plan to go far. You might get caught in traffic. · For a laparoscopic surgery, most people can go back to work or their normal routine in 1 to 2 weeks, but it may take longer. Diet · Bariatric full liquids for 2 weeks post op until we see you in our office Medicines · Take pain medicines exactly as directed. ¨ If the doctor gave you a prescription medicine for pain, take it as prescribed. ¨ If you are not taking a prescription pain medicine, take an over-the-counter medicine such as acetaminophen (Tylenol), . Read and follow all instructions on the label. ¨ Do not take two or more pain medicines at the same time unless the doctor told you to. Many pain medicines contain acetaminophen, which is Tylenol. Too much Tylenol can be harmful. · If you think your pain medicine is making you sick to your stomach: 
¨ Take your medicine after meals (unless your doctor tells you not to). ¨ Ask your doctor for a different pain medicine. · If your doctor prescribed antibiotics, take them as directed. Do not stop taking them just because you feel better. You need to take the full course of antibiotics. Incision care · After 24 to 48 hours, wash the area daily with warm, soapy water, and pat it dry. · Keep the area clean and dry. You may cover it with a gauze bandage if it weeps or rubs against clothing. Change the bandage every day. Follow-up care is a key part of your treatment and safety. Please call our office for a 2 week follow follow up appointment with your surgeon at [phone] Be sure to make and go to all appointments, and call your doctor if you are having problems. Call your doctor now or seek immediate medical care if: 
· You are sick to your stomach and cannot drink fluids. · You have pain that does not get better when you take your pain medicine. · You have signs of infection, such as: 
¨ Increased pain, swelling, warmth, or redness. ¨ Red streaks leading from the incision. ¨ Pus draining from the incision. ¨ Swollen lymph nodes in your neck, armpits, or groin. ¨ A fever · Bright red blood has soaked through a large bandage over your incision. · You have signs of a blood clot, such as: 
¨ Pain in your calf, back of knee, thigh, or groin. © 8952-3379 [employer]wise, [employer]. Care instructions adapted under license by Yoni Hardin (which disclaims liability or warranty for this information). This care instruction is for use with your licensed healthcare professional. If you have questions about a medical condition or this instruction, always ask your healthcare professional. Raj Canchola any warranty or liability for your use of this information. DISCHARGE SUMMARY from Nurse PATIENT INSTRUCTIONS: 
 
After general anesthesia or intravenous sedation, for 24 hours or while taking prescription Narcotics: · Limit your activities · Do not drive and operate hazardous machinery · Do not make important personal or business decisions · Do  not drink alcoholic beverages · If you have not urinated within 8 hours after discharge, please contact your surgeon on call. Report the following to your surgeon: 
· Excessive pain, swelling, redness or odor of or around the surgical area · Temperature over 100.5 · Nausea and vomiting lasting longer than 4 hours or if unable to take medications · Any signs of decreased circulation or nerve impairment to extremity: change in color, persistent  numbness, tingling, coldness or increase pain · Any questions What to do at Home: *  Please give a list of your current medications to your Primary Care Provider. *  Please update this list whenever your medications are discontinued, doses are 
    changed, or new medications (including over-the-counter products) are added. *  Please carry medication information at all times in case of emergency situations. These are general instructions for a healthy lifestyle: No smoking/ No tobacco products/ Avoid exposure to second hand smoke Surgeon General's Warning:  Quitting smoking now greatly reduces serious risk to your health. Obesity, smoking, and sedentary lifestyle greatly increases your risk for illness A healthy diet, regular physical exercise & weight monitoring are important for maintaining a healthy lifestyle You may be retaining fluid if you have a history of heart failure or if you experience any of the following symptoms:  Weight gain of 3 pounds or more overnight or 5 pounds in a week, increased swelling in our hands or feet or shortness of breath while lying flat in bed. Please call your doctor as soon as you notice any of these symptoms; do not wait until your next office visit. Recognize signs and symptoms of STROKE: 
 
F-face looks uneven A-arms unable to move or move unevenly S-speech slurred or non-existent T-time-call 911 as soon as signs and symptoms begin-DO NOT go Back to bed or wait to see if you get better-TIME IS BRAIN. Warning Signs of HEART ATTACK Call 911 if you have these symptoms: 
? Chest discomfort. Most heart attacks involve discomfort in the center of the chest that lasts more than a few minutes, or that goes away and comes back. It can feel like uncomfortable pressure, squeezing, fullness, or pain. ? Discomfort in other areas of the upper body. Symptoms can include pain or discomfort in one or both arms, the back, neck, jaw, or stomach. ? Shortness of breath with or without chest discomfort. ? Other signs may include breaking out in a cold sweat, nausea, or lightheadedness. Don't wait more than five minutes to call [address]! Fast action can save your life. Calling 911 is almost always the fastest way to get lifesaving treatment. Emergency Medical Services staff can begin treatment when they arrive  up to an hour sooner than if someone gets to the hospital by car. The discharge information has been reviewed with the patient. The patient verbalized understanding. Discharge medications reviewed with the patient and appropriate educational materials and side effects teaching were provided. ___________________________________________________________________________________________________________________________________ Chart Review Routing History No Routing History on File

## 2018-06-27 NOTE — H&P
Surgery History and Physical     Rene Palmer is a 45 y.o. female scheduled for laparoscopy removal of silastic ring, resection of gastric pouch, and repair of hiatal hernia with Dr Jatinder Wiley on June 27, 2018 at [facility]. Patient comes in office today for history and physical, and to receive preoperative liver shrinking diet. Patient with history of initial gastric bypass in 2002. 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 Patient height is 4'11'', weight is 195 pounds, BMI is 39.05          Patient Active Problem List     Diagnosis Date Noted    Acquired hypothyroidism 08/18/2017    Anemia      Status post gastric bypass for obesity 10/02/2010    Morbid obesity ([address] 10/02/2010    Sleep apnea 10/02/2010    Chronic low back pain 10/02/2010    Right knee pain 10/02/2010    Pain, upper back 10/02/2010           Past Medical History:   Diagnosis Date    Anemia      Arthritis      Chronic low back pain 10/2/2010    Coagulation disorder ([address]       ANEMIA    Dyspepsia and other specified disorders of function of stomach      GERD (gastroesophageal reflux disease)      Gigantomastia 10/2/2010    Ill-defined condition       ANXIETY    Incontinence of urine 10/2/2010    Morbid obesity ([address] 10/2/2010    Musculoskeletal disorder      Pain, upper back 10/2/2010    Psychosocial circumstance 10/2/2010    Right knee pain 10/2/2010    Shoulder pain 10/2/2010    Sleep apnea 10/2/2010     HAS NOT BEEN TESTED    SOB (shortness of breath) 10/2/2010    Status post gastric bypass for obesity 10/2/2010    Thyroid disease               Past Surgical History:   Procedure Laterality Date    GASTRIC BYPASS,OBESE<150CM AUREA-EN-Y   6/7/02     decompression of 5by 7cm chocolate cyst     HX ABDOMINOPLASTY   04    HX BREAST REDUCTION   2004    HX CHOLECYSTECTOMY   6/7/02         HX CYST REMOVAL   6/20/91     left wrist     HX GASTRIC BYPASS   2002    HX OTHER SURGICAL   05     thigh lift    HX TUBAL LIGATION   1995     [facility]    IR IVC FILTER   6/7/02         VAGOTOMY/PYLOROPLASTY,HI SELECT   6/7/02                    Social History   Substance Use Topics    Smoking status: Former Smoker       Quit date: 2015    Smokeless tobacco: Never Used    Alcohol use Yes          Comment: rare 2-3 TIMES A YEAR              Family History   Problem Relation Age of Onset    Cancer Mother         BREAST    Heart defect Mother         irregular heat beat    Heart Disease Mother      Arthritis-rheumatoid Father      Hypertension Father      Diabetes Father      Heart Disease Father      Thyroid Disease Father      Diabetes Sister      Heart Disease Sister      Thyroid Disease Sister      Kidney Disease Sister      Diabetes Brother      Heart Disease Brother      Stroke Brother      Stroke Paternal Grandmother      Other Sister         BRAIN ANEURSYM    Drug Abuse Brother      Alcohol abuse Brother                 Prior to Admission medications    Medication Sig Start Date End Date Taking? Authorizing Provider   raNITIdine (ZANTAC 75) 75 mg tablet Take 150 mg by mouth nightly.     Yes Historical Provider   ondansetron (ZOFRAN ODT) 4 mg disintegrating tablet Take 1 Tab by mouth every six (6) hours as needed for Nausea. 5/25/18   Yes Lora Domingo NP   levothyroxine (SYNTHROID) 100 mcg tablet TAKE 1 TABLET BY ORAL ROUTE EVERY DAY MONDAY TO SATURDAY, 1.5 TABLET SUNDAY 2/13/18   Yes Historical Provider   gabapentin (NEURONTIN) 300 mg capsule 900 mg three (3) times daily. 2/7/18   Yes Historical Provider   venlafaxine-SR (EFFEXOR-XR) 150 mg capsule TAKE ONE CAPSULE BY MOUTH EVERY MORNING 1/18/18   Yes Historical Provider   orphenadrine citrate (NORFLEX) 100 mg sr tablet two (2) times a day. 2/7/18   Yes Historical Provider   PREMPRO 0.625-2.5 mg per tablet 1 Tab daily. 9/10/17   Yes Historical Provider   omeprazole (PRILOSEC) 40 mg capsule Take 40 mg by mouth every morning.     Yes Historical Provider   cyanocobalamin (VITAMIN B-12) 1,000 mcg sublingual tablet Take 1,000 mcg by mouth daily.     Yes Historical Provider   CALCIUM CARBONATE/VITAMIN D3 (CALCIUM + D PO) Take 1 Tab by mouth daily.     Yes Historical Provider   MULTI-VITAMIN PO Take  by mouth.     Yes Historical Provider   ERGOCALCIFEROL, VITAMIN D2, (VITAMIN D PO) Take 2,000 Units by mouth two (2) times a day.     Yes Historical Provider            Allergies   Allergen Reactions    Aspirin Other (comments)       Due to GBP surgery    Other Medication Other (comments)       Steroids, can't take due to Gastric Bypass surgery              HPI     Review of Systems   Constitutional: Negative for fever. HENT: Negative for congestion, hearing loss and sinus pain. Eyes: Positive for blurred vision. Negative for double vision and photophobia. Eyeglasses for reading. Respiratory: Negative for cough, sputum production and shortness of breath. Cardiovascular: Negative for chest pain, palpitations and leg swelling. Gastrointestinal: Positive for constipation, heartburn, nausea and vomiting. Negative for abdominal pain and diarrhea. Genitourinary: Negative for dysuria, frequency and urgency. No kidney stone history   Musculoskeletal: Positive for back pain, joint pain and neck pain. Skin: Negative for itching and rash. Neurological: Negative for dizziness, seizures, loss of consciousness and headaches. Endo/Heme/Allergies:        No diabetes, no anemia, no gout. Psychiatric/Behavioral: The patient is nervous/anxious and has insomnia.          Physical Exam   Constitutional: She is oriented to person, place, and time. She appears well-developed and well-nourished. No distress. Neck: Trachea normal and normal range of motion. Neck supple. No JVD present. Carotid bruit is not present.  No tracheal deviation present. Cardiovascular: Normal rate, regular rhythm and normal heart sounds. Pulmonary/Chest: Effort normal and breath sounds normal. No respiratory distress. She has no decreased breath sounds. She has no wheezes. She has no rhonchi. She has no rales. She exhibits no laceration, no crepitus and no retraction. Abdominal: Soft. Bowel sounds are normal. She exhibits no distension. There is no hepatosplenomegaly. There is no tenderness. There is no rebound and no guarding. Previous surgical incision dry and intact. No hernia/masses palpated    Musculoskeletal: Normal range of motion. She exhibits no edema. Lymphadenopathy:        Head (right side): No submental, no submandibular, no tonsillar, no preauricular and no posterior auricular adenopathy present. Head (left side): No submental, no submandibular, no tonsillar, no preauricular and no posterior auricular adenopathy present. She has no cervical adenopathy. She has no axillary adenopathy. Neurological: She is alert and oriented to person, place, and time. She has normal strength. Skin: Skin is warm and dry. No rash noted. No cyanosis or erythema. Nails show no clubbing. Psychiatric: She has a normal mood and affect. Her speech is normal and behavior is normal. Thought content normal.   Blood pressure 124/78, pulse 71, temperature 98 °F (36.7 °C), resp. rate 20, height 4' 11.25\" (1.505 m), weight 195 lb (88.5 kg), SpO2 97 %.          ASSESSMENT and PLAN     Patient is scheduled for laparoscopy removal of silastic ring, resection of gastric pouch, and repair of hiatal hernia with Dr Lloyd Day on June 27, 2018 at [facility].Counseled patient in regard to post diet restrictions, follow- up office visits, follow- up care, and follow up medications. Patient as received educational booklet and vitamin list. Patient to start liver shrinking diet on June 13, 2018.  Answered all questions and patient wishes to proceed.      Signed By: Cherylene Douglas, NP      May 25, 2018        27 minutes was spent with patient.

## 2018-06-27 NOTE — DISCHARGE INSTRUCTIONS
Surgical Discharge Instructions  June 27 2018   Procedure: Procedure(s):  LAPAROSCOPIC REMOVAL OF MARLEX MESH  BAND, LAPAROSCOPIC EXTENSIVE LYSIS OF ADHESION,LAPAROSCOPIC HIATAL HERNIA REPAIR WITH MESH, EGD   ESOPHAGOGASTRODUODENOSCOPY (EGD)    You have  a 2 week follow-up appointment. Future Appointments  Date Time Provider Hasmukh Ernestina   7/10/2018 8:20 AM ELMA LambertSM JANETT SHELDON   7/25/2018 9:20 AM ELMA Saba   8/15/2018 9:20 AM ELMA Saba       Office address is: [facility] [address] Belia TANG/ [address] [facility], [address] [place] [address]  [phone]  Post Operative Surgical Instructions:  After your surgery, it is normal to feel weak and tired for a few  days after you return home. If you had laparoscopic surgery, you may also have pain in your shoulder for about 24 hours. · For a laparoscopic surgery, most people can go back to work or their normal routine in 1 to 2 weeks, but it may take longer, depending on the type of work you do. This care sheet gives you a general idea about how long it will take for you to recover. However, each person recovers at a different pace. Follow the steps below to get better as quickly as possible. How can you care for yourself at home? Activity  · Rest when you feel tired. Getting enough sleep will help you recover. · Try to walk each day. Start out by walking a little more than you did the day before. Gradually increase the amount you walk. Walking boosts blood flow and helps prevent pneumonia and constipation. · For about 2 to 4 weeks, avoid lifting anything that would make you strain. This may include a child, heavy grocery bags and milk containers, a heavy briefcase or backpack, cat litter or dog food bags, or a vacuum . · Avoid strenuous activities, such as biking, jogging, weightlifting, and aerobic exercise, until your doctor says it is okay.   · You may shower. Yeni Figures the incisions dry. Do not take a bath for the first 2 weeks, or until your doctor tells you it is okay. · You may drive when you are no longer taking pain medicine and can quickly move your foot from the gas pedal to the brake. You must also be able to sit comfortably for a long period of time, even if you do not plan to go far. You might get caught in traffic. · For a laparoscopic surgery, most people can go back to work or their normal routine in 1 to 2 weeks, but it may take longer. Diet  · Bariatric full liquids for 2 weeks post op until we see you in our office   Medicines  · Take pain medicines exactly as directed. ¨ If the doctor gave you a prescription medicine for pain, take it as prescribed. ¨ If you are not taking a prescription pain medicine, take an over-the-counter medicine such as acetaminophen (Tylenol), . Read and follow all instructions on the label. ¨ Do not take two or more pain medicines at the same time unless the doctor told you to. Many pain medicines contain acetaminophen, which is Tylenol. Too much Tylenol can be harmful. · If you think your pain medicine is making you sick to your stomach:  ¨ Take your medicine after meals (unless your doctor tells you not to). ¨ Ask your doctor for a different pain medicine. · If your doctor prescribed antibiotics, take them as directed. Do not stop taking them just because you feel better. You need to take the full course of antibiotics. Incision care  · After 24 to 48 hours, wash the area daily with warm, soapy water, and pat it dry. · Keep the area clean and dry. You may cover it with a gauze bandage if it weeps or rubs against clothing. Change the bandage every day. Follow-up care is a key part of your treatment and safety. Please call our office for a 2 week follow follow up appointment with your surgeon at [phone]  Be sure to make and go to all appointments, and call your doctor if you are having problems.      Call your doctor now or seek immediate medical care if:  · You are sick to your stomach and cannot drink fluids. · You have pain that does not get better when you take your pain medicine. · You have signs of infection, such as:  ¨ Increased pain, swelling, warmth, or redness. ¨ Red streaks leading from the incision. ¨ Pus draining from the incision. ¨ Swollen lymph nodes in your neck, armpits, or groin. ¨ A fever  · Bright red blood has soaked through a large bandage over your incision. · You have signs of a blood clot, such as:  ¨ Pain in your calf, back of knee, thigh, or groin. © 1655-7829 [employer]. Care instructions adapted under license by [facility] (which disclaims liability or warranty for this information). This care instruction is for use with your licensed healthcare professional. If you have questions about a medical condition or this instruction, always ask your healthcare professional. Charles Cantuer any warranty or liability for your use of this information. DISCHARGE SUMMARY from Nurse    PATIENT INSTRUCTIONS:    After general anesthesia or intravenous sedation, for 24 hours or while taking prescription Narcotics:  · Limit your activities  · Do not drive and operate hazardous machinery  · Do not make important personal or business decisions  · Do  not drink alcoholic beverages  · If you have not urinated within 8 hours after discharge, please contact your surgeon on call.     Report the following to your surgeon:  · Excessive pain, swelling, redness or odor of or around the surgical area  · Temperature over 100.5  · Nausea and vomiting lasting longer than 4 hours or if unable to take medications  · Any signs of decreased circulation or nerve impairment to extremity: change in color, persistent  numbness, tingling, coldness or increase pain  · Any questions    What to do at Home:    *  Please give a list of your current medications to your Primary Care Provider. *  Please update this list whenever your medications are discontinued, doses are      changed, or new medications (including over-the-counter products) are added. *  Please carry medication information at all times in case of emergency situations. These are general instructions for a healthy lifestyle:    No smoking/ No tobacco products/ Avoid exposure to second hand smoke  Surgeon General's Warning:  Quitting smoking now greatly reduces serious risk to your health. Obesity, smoking, and sedentary lifestyle greatly increases your risk for illness    A healthy diet, regular physical exercise & weight monitoring are important for maintaining a healthy lifestyle    You may be retaining fluid if you have a history of heart failure or if you experience any of the following symptoms:  Weight gain of 3 pounds or more overnight or 5 pounds in a week, increased swelling in our hands or feet or shortness of breath while lying flat in bed. Please call your doctor as soon as you notice any of these symptoms; do not wait until your next office visit. Recognize signs and symptoms of STROKE:    F-face looks uneven    A-arms unable to move or move unevenly    S-speech slurred or non-existent    T-time-call 911 as soon as signs and symptoms begin-DO NOT go       Back to bed or wait to see if you get better-TIME IS BRAIN. Warning Signs of HEART ATTACK     Call 911 if you have these symptoms:   Chest discomfort. Most heart attacks involve discomfort in the center of the chest that lasts more than a few minutes, or that goes away and comes back. It can feel like uncomfortable pressure, squeezing, fullness, or pain.  Discomfort in other areas of the upper body. Symptoms can include pain or discomfort in one or both arms, the back, neck, jaw, or stomach.  Shortness of breath with or without chest discomfort.  Other signs may include breaking out in a cold sweat, nausea, or lightheadedness.   Don't wait more than five minutes to call 911 - MINUTES MATTER! Fast action can save your life. Calling 911 is almost always the fastest way to get lifesaving treatment. Emergency Medical Services staff can begin treatment when they arrive -- up to an hour sooner than if someone gets to the hospital by car. The discharge information has been reviewed with the patient. The patient verbalized understanding. Discharge medications reviewed with the patient and appropriate educational materials and side effects teaching were provided.   ___________________________________________________________________________________________________________________________________

## 2018-06-27 NOTE — PERIOP NOTES
Family updated in surgical waiting, no room assignment at this time. Pt resting, vitals stable, will continue to monitor. 1307: Dr. Sienna Torrez aware pt complaining of itching after multiple doses of Benadryl. Orders received.

## 2018-06-27 NOTE — PROGRESS NOTES
TRANSFER - IN REPORT:    Verbal report received from Dwayne) on Chikiedwin PandeyAp  being received from [facility]) for routine progression of care      Report consisted of patients Situation, Background, Assessment and   Recommendations(SBAR). Information from the following report(s) SBAR, Intake/Output, MAR, Recent Results and Cardiac Rhythm NSR was reviewed with the receiving nurse. Opportunity for questions and clarification was provided. Assessment completed upon patients arrival to unit and care assumed.

## 2018-06-27 NOTE — PROGRESS NOTES
Primary Nurse Roman Bernard RN and RONY Milan performed a dual skin assessment on this patient No impairment noted  Wilber score is 23

## 2018-06-27 NOTE — ROUTINE PROCESS
Patient: Paz Ford MRN: [id number]  SSN: [id number]   YOB: 1965  Age: 45 y.o. Sex: female     Patient is status post Procedure(s):  LAPAROSCOPIC REMOVAL SILASTIC GASTRIC BAND, LAPAROSCOPIC EXTENSIVE LYSIS OF ADHESION,LAPAROSCOPIC HIATAL HERNIA REPAIR WITH MESH, EGD   ESOPHAGOGASTRODUODENOSCOPY (EGD).     Surgeon(s) and Role:     * Peace Ordaz MD - Primary                      Peripheral IV 06/27/18 Left Hand (Active)   Dressing Status New 6/27/2018  6:36 AM   Dressing Type Transparent 6/27/2018  6:36 AM   Hub Color/Line Status Infusing 6/27/2018  6:36 AM                           Dressing/Packing:  Wound Abdomen-DRESSING TYPE: Topical skin adhesive/glue (4 TROCARS SITES) (06/27/18 1130)  Splint/Cast:  ]

## 2018-06-27 NOTE — IP AVS SNAPSHOT
[address] 
[phone] Patient: Sisi Granado MRN: [id number] :[id number] About your hospitalization You were admitted on:  2018 You last received care in the:  [facility] 4 SURG/BARIATRICS You were discharged on:  2018 Why you were hospitalized Your primary diagnosis was:  Not on File Your diagnoses also included:  Gerd (Gastroesophageal Reflux Disease) Follow-up Information Follow up With Details Comments Contact Info Reginald Ledesma MD   Patient can only remember the practice name and not the physician Your Scheduled Appointments Tuesday July 10, 2018  8:20 AM EDT  
POST OP 10 MIN with Ely Bob NP  
[facility] 22 921 ([address] [place] [address] Road) [address] 7 [phone]  
[phone] 2018  9:20 AM EDT  
POST OP 10 MIN with Cherylene Douglas, NP  
[facility] 22 315 ([address] [place] [address] Road) [address] 7 [phone]  
[phone] Wednesday August 15, 2018  9:20 AM EDT  
POST OP 10 MIN with Cherylene Douglas, NP  
[facility] 22 770 ([address] [place] [address] Road) [address] [place] [phone]  
[phone] Discharge Orders None A check matty indicates which time of day the medication should be taken. My Medications START taking these medications Instructions Each Dose to Equal  
 Morning Noon Evening Bedtime HYDROcodone-acetaminophen 5-325 mg per tablet Commonly known as:  Marissa Babin Your last dose was: Your next dose is: Take 1-2 Tabs by mouth every four (4) hours as needed. Max Daily Amount: 12 Tabs. 1-2 Tab CHANGE how you take these medications Instructions Each Dose to Equal  
 Morning Noon Evening Bedtime ondansetron 4 mg disintegrating tablet Commonly known as:  ZOFRAN ODT What changed:  when to take this Your last dose was: Your next dose is: Take 1 Tab by mouth every eight (8) hours as needed for Nausea. 4 mg CONTINUE taking these medications Instructions Each Dose to Equal  
 Morning Noon Evening Bedtime CALCIUM + D PO Your last dose was: Your next dose is: Take 1 Tab by mouth daily. 1 Tab  
    
   
   
   
  
 gabapentin 300 mg capsule Commonly known as:  NEURONTIN Your last dose was: Your next dose is:    
   
   
 900 mg three (3) times daily. 900 mg  
    
   
   
   
  
 levothyroxine 100 mcg tablet Commonly known as:  SYNTHROID Your last dose was: Your next dose is: TAKE 1 TABLET BY ORAL ROUTE EVERY DAY MONDAY TO SATURDAY, 1.5 TABLET SUNDAY MULTI-VITAMIN PO Your last dose was: Your next dose is: Take  by mouth. omeprazole 40 mg capsule Commonly known as:  PRILOSEC Your last dose was: Your next dose is: Take 40 mg by mouth every morning. 40 mg  
    
   
   
   
  
 orphenadrine citrate 100 mg sr tablet Commonly known as:  NORFLEX Your last dose was: Your next dose is:    
   
   
 two (2) times a day. PREMPRO 0.625-2.5 mg per tablet Generic drug:  estrogen (conjugated)-medroxyPROGESTERone Your last dose was: Your next dose is:    
   
   
 1 Tab daily. 1 Tab  
    
   
   
   
  
 venlafaxine- mg capsule Commonly known as:  EFFEXOR-XR Your last dose was: Your next dose is: TAKE ONE CAPSULE BY MOUTH EVERY MORNING  
     
   
   
   
  
 VITAMIN B-12 1,000 mcg sublingual tablet Generic drug:  cyanocobalamin Your last dose was: Your next dose is: Take 1,000 mcg by mouth daily. 1000 mcg VITAMIN D2 PO Your last dose was: Your next dose is: Take 2,000 Units by mouth two (2) times a day. 2000 Units ZANTAC 75 75 mg tablet Generic drug:  raNITIdine Your last dose was: Your next dose is: Take 150 mg by mouth nightly. 150 mg Where to Get Your Medications Information on where to get these meds will be given to you by the nurse or doctor. ! Ask your nurse or doctor about these medications HYDROcodone-acetaminophen 5-325 mg per tablet  
 ondansetron 4 mg disintegrating tablet Opioid Education Prescription Opioids: What You Need to Know: 
 
Prescription opioids can be used to help relieve moderate-to-severe pain and are often prescribed following a surgery or injury, or for certain health conditions. These medications can be an important part of treatment but also come with serious risks. Opioids are strong pain medicines. Examples include hydrocodone, oxycodone, fentanyl, and morphine. Heroin is an example of an illegal opioid. It is important to work with your health care provider to make sure you are getting the safest, most effective care. WHAT ARE THE RISKS AND SIDE EFFECTS OF OPIOID USE? Prescription opioids carry serious risks of addiction and overdose, especially with prolonged use. An opioid overdose, often marked by slow breathing, can cause sudden death. The use of prescription opioids can have a number of side effects as well, even when taken as directed. · Tolerance-meaning you might need to take more of a medication for the same pain relief · Physical dependence-meaning you have symptoms of withdrawal when the medication is stopped. Withdrawal symptoms can include nausea, sweating, chills, diarrhea, stomach cramps, and muscle aches.   Withdrawal can last up to several weeks, depending on which drug you took and how long you took it. · Increased sensitivity to pain · Constipation · Nausea, vomiting, and dry mouth · Sleepiness and dizziness · Confusion · Depression · Low levels of testosterone that can result in lower sex drive, energy, and strength · Itching and sweating RISKS ARE GREATER WITH:      
· History of drug misuse, substance use disorder, or overdose · Mental health conditions (such as depression or anxiety) · Sleep apnea · Older age (72 years or older) · Pregnancy Avoid alcohol while taking prescription opioids. Also, unless specifically advised by your health care provider, medications to avoid include: · Benzodiazepines (such as Xanax or Valium) · Muscle relaxants (such as Soma or Flexeril) · Hypnotics (such as Ambien or Lunesta) · Other prescription opioids KNOW YOUR OPTIONS Talk to your health care provider about ways to manage your pain that don't involve prescription opioids. Some of these options may actually work better and have fewer risks and side effects. Options may include: 
· Pain relievers such as acetaminophen, ibuprofen, and naproxen · Some medications that are also used for depression or seizures · Physical therapy and exercise · Counseling to help patients learn how to cope better with triggers of pain and stress. · Application of heat or cold compress · Massage therapy · Relaxation techniques Be Informed Make sure you know the name of your medication, how much and how often to take it, and its potential risks & side effects. IF YOU ARE PRESCRIBED OPIOIDS FOR PAIN: 
· Never take opioids in greater amounts or more often than prescribed. Remember the goal is not to be pain-free but to manage your pain at a tolerable level. · Follow up with your primary care provider to: · Work together to create a plan on how to manage your pain. · Talk about ways to help manage your pain that don't involve prescription opioids. · Talk about any and all concerns and side effects. · Help prevent misuse and abuse. · Never sell or share prescription opioids · Help prevent misuse and abuse. · Store prescription opioids in a secure place and out of reach of others (this may include visitors, children, friends, and family). · Safely dispose of unused/unwanted prescription opioids: Find your community drug take-back program or your pharmacy mail-back program, or flush them down the toilet, following guidance from the Food and Drug Administration (www.fda.gov/Drugs/ResourcesForYou). · Visit www.cdc.gov/drugoverdose to learn about the risks of opioid abuse and overdose. · If you believe you may be struggling with addiction, tell your health care provider and ask for guidance or call [address] [place], [place] [address]rose [facility] at [phone]. Discharge Instructions Surgical Discharge Instructions June 27 2018 Procedure: Procedure(s): LAPAROSCOPIC REMOVAL OF MARLEX MESH  BAND, LAPAROSCOPIC EXTENSIVE LYSIS OF ADHESION,LAPAROSCOPIC HIATAL HERNIA REPAIR WITH MESH, EGD  
ESOPHAGOGASTRODUODENOSCOPY (EGD) You have  a 2 week follow-up appointment. Future Appointments Date Time Provider Hasmukh Do 7/10/2018 8:20 AM Gordo Monet NP [facility] JANETT SHELDON  
7/25/2018 9:20 AM ELMA Figueredo  
8/15/2018 9:20 AM ELMA Figueredo Office address is: [address] [place] [address] 
[phone] Post Operative Surgical Instructions: After your surgery, it is normal to feel weak and tired for a few  days after you return home. If you had laparoscopic surgery, you may also have pain in your shoulder for about 24 hours. · For a laparoscopic surgery, most people can go back to work or their normal routine in 1 to 2 weeks, but it may take longer, depending on the type of work you do. This care sheet gives you a general idea about how long it will take for you to recover. However, each person recovers at a different pace. Follow the steps below to get better as quickly as possible. How can you care for yourself at home? Activity · Rest when you feel tired. Getting enough sleep will help you recover. · Try to walk each day. Start out by walking a little more than you did the day before. Gradually increase the amount you walk. Walking boosts blood flow and helps prevent pneumonia and constipation. · For about 2 to 4 weeks, avoid lifting anything that would make you strain. This may include a child, heavy grocery bags and milk containers, a heavy briefcase or backpack, cat litter or dog food bags, or a vacuum . · Avoid strenuous activities, such as biking, jogging, weightlifting, and aerobic exercise, until your doctor says it is okay. · You may shower. Louis Gustafsonua the incisions dry. Do not take a bath for the first 2 weeks, or until your doctor tells you it is okay. · You may drive when you are no longer taking pain medicine and can quickly move your foot from the gas pedal to the brake. You must also be able to sit comfortably for a long period of time, even if you do not plan to go far. You might get caught in traffic. · For a laparoscopic surgery, most people can go back to work or their normal routine in 1 to 2 weeks, but it may take longer. Diet · Bariatric full liquids for 2 weeks post op until we see you in our office Medicines · Take pain medicines exactly as directed. ¨ If the doctor gave you a prescription medicine for pain, take it as prescribed. ¨ If you are not taking a prescription pain medicine, take an over-the-counter medicine such as acetaminophen (Tylenol), .  Read and follow all instructions on the label. ¨ Do not take two or more pain medicines at the same time unless the doctor told you to. Many pain medicines contain acetaminophen, which is Tylenol. Too much Tylenol can be harmful. · If you think your pain medicine is making you sick to your stomach: 
¨ Take your medicine after meals (unless your doctor tells you not to). ¨ Ask your doctor for a different pain medicine. · If your doctor prescribed antibiotics, take them as directed. Do not stop taking them just because you feel better. You need to take the full course of antibiotics. Incision care · After 24 to 48 hours, wash the area daily with warm, soapy water, and pat it dry. · Keep the area clean and dry. You may cover it with a gauze bandage if it weeps or rubs against clothing. Change the bandage every day. Follow-up care is a key part of your treatment and safety. Please call our office for a 2 week follow follow up appointment with your surgeon at [phone] Be sure to make and go to all appointments, and call your doctor if you are having problems. Call your doctor now or seek immediate medical care if: 
· You are sick to your stomach and cannot drink fluids. · You have pain that does not get better when you take your pain medicine. · You have signs of infection, such as: 
¨ Increased pain, swelling, warmth, or redness. ¨ Red streaks leading from the incision. ¨ Pus draining from the incision. ¨ Swollen lymph nodes in your neck, armpits, or groin. ¨ A fever · Bright red blood has soaked through a large bandage over your incision. · You have signs of a blood clot, such as: 
¨ Pain in your calf, back of knee, thigh, or groin. © 4219-5257 Healthwise, Incorporated. Care instructions adapted under license by [facility] [employer] (which disclaims liability or warranty for this information).  This care instruction is for use with your licensed healthcare professional. If you have questions about a medical condition or this instruction, always ask your healthcare professional. Dene [employer] any warranty or liability for your use of this information. DISCHARGE SUMMARY from Nurse PATIENT INSTRUCTIONS: 
 
After general anesthesia or intravenous sedation, for 24 hours or while taking prescription Narcotics: · Limit your activities · Do not drive and operate hazardous machinery · Do not make important personal or business decisions · Do  not drink alcoholic beverages · If you have not urinated within 8 hours after discharge, please contact your surgeon on call. Report the following to your surgeon: 
· Excessive pain, swelling, redness or odor of or around the surgical area · Temperature over 100.5 · Nausea and vomiting lasting longer than 4 hours or if unable to take medications · Any signs of decreased circulation or nerve impairment to extremity: change in color, persistent  numbness, tingling, coldness or increase pain · Any questions What to do at Home: *  Please give a list of your current medications to your Primary Care Provider. *  Please update this list whenever your medications are discontinued, doses are 
    changed, or new medications (including over-the-counter products) are added. *  Please carry medication information at all times in case of emergency situations. These are general instructions for a healthy lifestyle: No smoking/ No tobacco products/ Avoid exposure to second hand smoke Surgeon General's Warning:  Quitting smoking now greatly reduces serious risk to your health. Obesity, smoking, and sedentary lifestyle greatly increases your risk for illness A healthy diet, regular physical exercise & weight monitoring are important for maintaining a healthy lifestyle You may be retaining fluid if you have a history of heart failure or if you experience any of the following symptoms:  Weight gain of 3 pounds or more overnight or 5 pounds in a week, increased swelling in our hands or feet or shortness of breath while lying flat in bed. Please call your doctor as soon as you notice any of these symptoms; do not wait until your next office visit. Recognize signs and symptoms of STROKE: 
 
F-face looks uneven A-arms unable to move or move unevenly S-speech slurred or non-existent T-time-call 911 as soon as signs and symptoms begin-DO NOT go Back to bed or wait to see if you get better-TIME IS BRAIN. Warning Signs of HEART ATTACK Call 911 if you have these symptoms: 
? Chest discomfort. Most heart attacks involve discomfort in the center of the chest that lasts more than a few minutes, or that goes away and comes back. It can feel like uncomfortable pressure, squeezing, fullness, or pain. ? Discomfort in other areas of the upper body. Symptoms can include pain or discomfort in one or both arms, the back, neck, jaw, or stomach. ? Shortness of breath with or without chest discomfort. ? Other signs may include breaking out in a cold sweat, nausea, or lightheadedness. Don't wait more than five minutes to call [address]! Fast action can save your life. Calling 911 is almost always the fastest way to get lifesaving treatment. Emergency Medical Services staff can begin treatment when they arrive  up to an hour sooner than if someone gets to the hospital by car. The discharge information has been reviewed with the patient. The patient verbalized understanding. Discharge medications reviewed with the patient and appropriate educational materials and side effects teaching were provided. ___________________________________________________________________________________________________________________________________ MyChart Announcement We are excited to announce that we are making your provider's discharge notes available to you in Quote Roller. You will see these notes when they are completed and signed by the physician that discharged you from your recent hospital stay. If you have any questions or concerns about any information you see in Quote Roller, please call the Health Information Department where you were seen or reach out to your Primary Care Provider for more information about your plan of care. Introducing [facility]! Dear Román Yun: Thank you for requesting a Quote Roller account. Our records indicate that you already have an active Quote Roller account. You can access your account anytime at [email or web address] Did you know that you can access your hospital and ER discharge instructions at any time in Quote Roller? You can also review all of your test results from your hospital stay or ER visit. Additional Information If you have questions, please visit the Frequently Asked Questions section of the Quote Roller website at [email or web address] Remember, Quote Roller is NOT to be used for urgent needs. For medical emergencies, dial 911. Now available from your iPhone and Android! Introducing Dorian Fields As a [facility] patient, I wanted to make you aware of our electronic visit tool called Dorian Fields. [facility] 24/7 allows you to connect within minutes with a medical provider 24 hours a day, seven days a week via a mobile device or tablet or logging into a secure website from your computer. You can access Dorian Carlylejmfin from anywhere in the [place].  
 
A virtual visit might be right for you when you have a simple condition and feel like you just dont want to get out of bed, or cant get away from work for an appointment, when your regular [facility] provider is not available (evenings, weekends or holidays), or when youre out of town and need minor care. Electronic visits cost only $49 and if the [employer] 24/7 provider determines a prescription is needed to treat your condition, one can be electronically transmitted to a nearby pharmacy*. Please take a moment to enroll today if you have not already done so. The enrollment process is free and takes just a few minutes. To enroll, please download the [employer] 24/7 christofer to your tablet or phone, or visit [email or web address] to enroll on your computer. And, as an [address] [place] [address] patient with a [employer] account, the results of your visits will be scanned into your electronic medical record and your primary care provider will be able to view the scanned results. We urge you to continue to see your regular [employer] provider for your ongoing medical care. And while your primary care provider may not be the one available when you seek a Webstep virtual visit, the peace of mind you get from getting a real diagnosis real time can be priceless. For more information on Webstep, view our Frequently Asked Questions (FAQs) at [email or web address]. Sincerely, 
 
Luis Enrique Hallman MD 
Chief Medical Officer [employer] *:  certain medications cannot be prescribed via Webstep Unresulted tests-please follow up with your PCP on these results Procedure/Test Authorizing Provider CBC W/O DIFF Peace Ordaz MD  
 METABOLIC PANEL, BASIC Peace Ordaz MD  
 XR UPPER GI W KUB/ RAMIN Ordaz MD  
  
Providers Seen During Your Hospitalization Provider Specialty Primary office phone Peace Ordaz MD Surgery [phone] Your Primary Care Physician (PCP) Primary Care Physician Office Phone Office Fax OTHER, PHYS ** None ** ** None ** You are allergic to the following Allergen Reactions Aspirin Other (comments) Due to GBP surgery Other Medication Other (comments) Steroids, can't take due to Gastric Bypass surgery Recent Documentation Height Weight Breastfeeding? BMI OB Status Smoking Status 1.505 m 91 kg No 40.18 kg/m2 Postmenopausal Former Smoker Emergency Contacts Name Discharge Info Relation Home Work Mobile [id number] CAREGIVER [3] Friend [5] [phone] Patient Belongings The following personal items are in your possession at time of discharge: 
  Dental Appliances: None  Visual Aid: None      Home Medications: None   Jewelry: None  Clothing:  (bag of clothes returned in PACU)    Other Valuables: None Discharge Instructions Attachments/References HYDROCODONE/ACETAMINOPHEN (BY MOUTH) (ENGLISH) ONDANSETRON (BY MOUTH, INTO THE MOUTH) (ENGLISH) Patient Handouts Hydrocodone/Acetaminophen (By mouth) Acetaminophen (q-ixte-o-MIN-oh-fen), Hydrocodone Bitartrate (ygz-nyls-NCO-done bye-TAR-trate) Treats pain. This medicine contains a narcotic pain reliever. Brand Name(s): Hycet, Lorcet, Lorcet HD, Lorcet Plus, Lortab 10/325, Lortab 5/325, Lortab 7.5/325, Lortab Elixir, Norco, Verdrocet, Vicodin, Vicodin ES, Vicodin HP, Xodol, Xodol 5/300 There may be other brand names for this medicine. When This Medicine Should Not Be Used: This medicine is not right for everyone. Do not use it if you had an allergic reaction to acetaminophen, hydrocodone, or other narcotic medicines, or stomach or bowel blockage (including paralytic ileus). How to Use This Medicine:  
Capsule, Liquid, Tablet · Your doctor will tell you how much medicine to use. Do not use more than directed. · An overdose can be dangerous. Follow directions carefully so you do not get too much medicine at one time. · Oral liquid: Measure the oral liquid medicine with a marked measuring spoon, oral syringe, or medicine cup. · Drink plenty of liquids to help avoid constipation. · This medicine should come with a Medication Guide. Ask your pharmacist for a copy if you do not have one. · Missed dose: Take a dose as soon as you remember. If it is almost time for your next dose, wait until then and take a regular dose. Do not take extra medicine to make up for a missed dose. · Store the medicine in a closed container at room temperature, away from heat, moisture, and direct light. Flush any unused Norco® tablets down the toilet. Drugs and Foods to Avoid: Ask your doctor or pharmacist before using any other medicine, including over-the-counter medicines, vitamins, and herbal products. · Do not use this medicine if you are using or have used an MAO inhibitor within the past 14 days. · Some medicines can affect how hydrocodone/acetaminophen works. Tell your doctor if you are using any of the following: ¨ Carbamazepine, erythromycin, ketoconazole, mirtazapine, phenytoin, rifampin, ritonavir, tramadol, trazodone ¨ Diuretic (water pill) ¨ Medicine to treat depression or mental health problems ¨ Medicine to treat migraine headaches ¨ Phenothiazine medicine · Tell your doctor if you use anything else that makes you sleepy. Some examples are allergy medicine, narcotic pain medicine, and alcohol. Tell your doctor if you are using buprenorphine, butorphanol, nalbuphine, pentazocine, or a muscle relaxer. · Do not drink alcohol while you are using this medicine. Acetaminophen can damage your liver, and your risk is higher if you also drink alcohol. Warnings While Using This Medicine: · Tell your doctor if you are pregnant or breastfeeding, or if you have kidney disease, liver disease, lung or breathing problems, gallbladder or pancreas problems, an underactive thyroid, Que disease, prostate problems, trouble urinating, stomach problems, or a history of head injury or brain tumor, seizures, alcohol or drug addiction. · This medicine may cause the following problems: ¨ High risk of overdose, which can lead to death ¨ Respiratory depression (serious breathing problem that can be life-threatening) ¨ Liver problems ¨ Serious skin reactions ¨ Serotonin syndrome (when used with certain medicines) · This medicine can be habit-forming. Do not use more than your prescribed dose. Call your doctor if you think your medicine is not working. · This medicine may make you dizzy or drowsy. Do not drive or doing anything else that could be dangerous until you know how this medicine affects you. · This medicine contains acetaminophen. Read the labels of all other medicines you are using to see if they also contain acetaminophen, or ask your doctor or pharmacist. Juventino Cadena not use more than 4 grams (4,000 milligrams) total of acetaminophen in one day. · Tell any doctor or dentist who treats you that you are using this medicine. This medicine may affect certain medical test results. · This medicine may cause constipation, especially with long-term use. Ask your doctor if you should use a laxative to prevent and treat constipation. · This medicine could cause infertility. Talk with your doctor before using this medicine if you plan to have children. · Keep all medicine out of the reach of children. Never share your medicine with anyone. Possible Side Effects While Using This Medicine:  
Call your doctor right away if you notice any of these side effects: · Allergic reaction: Itching or hives, swelling in your face or hands, swelling or tingling in your mouth or throat, chest tightness, trouble breathing · Anxiety, restlessness, fast heartbeat, fever, sweating, muscle spasms, twitching, diarrhea, seeing or hearing things that are not there · Blistering, peeling, red skin rash · Blue lips, fingernails, or skin · Dark urine or pale stools, loss of appetite, nausea or vomiting, stomach pain, yellow skin or eyes · Extreme weakness, shallow breathing, slow heartbeat, sweating, seizures, cold or clammy skin · Lightheadedness, dizziness, fainting If you notice these less serious side effects, talk with your doctor: · Constipation, nausea, vomiting · Tiredness or sleepiness If you notice other side effects that you think are caused by this medicine, tell your doctor. Call your doctor for medical advice about side effects. You may report side effects to FDA at 6-238-IFG-7090 © 2017 [facility] Information is for End User's use only and may not be sold, redistributed or otherwise used for commercial purposes. The above information is an  only. It is not intended as medical advice for individual conditions or treatments. Talk to your doctor, nurse or pharmacist before following any medical regimen to see if it is safe and effective for you. Ondansetron (By mouth, Into the mouth) Ondansetron (on-DAN-se-eva) Prevents nausea and vomiting. Brand Name(s): Zofran, Zofran ODT, Tisha Bladen There may be other brand names for this medicine. When This Medicine Should Not Be Used: This medicine is not right for everyone. Do not use it if you had an allergic reaction to ondansetron. How to Use This Medicine: Thin Sheet, Liquid, Tablet, Dissolving Tablet · Your doctor will tell you how much medicine to use. Do not use more than directed. · Measure the oral liquid medicine with a marked measuring spoon, oral syringe, or medicine cup. · To use the disintegrating tablet:  
¨ Do not open the blister pack that contains the tablet until you are ready to take it. ¨ Make sure your hands are dry. Peel back the foil, then remove the tablet from the blister pack. Do not push the tablet through the foil. ¨ Place the tablet on top of your tongue where it will dissolve in seconds. After the tablet has melted, swallow or take a sip of water. · To use the soluble film: ¨ Make sure your hands are clean and dry. ¨ Fold the pouch along the dotted line. ¨ While still folded, tear the pouch carefully along the edge. Remove the film from the pouch. ¨ Place the film on top of your tongue. It will dissolve in 4 to 20 seconds. Do not chew or swallow the film whole. ¨ After the film has dissolved, you may swallow with or without water. · Read and follow the patient instructions that come with this medicine. Talk to your doctor or pharmacist if you have any questions. · Missed dose: Take a dose as soon as you remember. If it is almost time for your next dose, wait until then and take a regular dose. Do not take extra medicine to make up for a missed dose. · Store the medicine in a closed container at room temperature, away from heat, moisture, and direct light. Keep the soluble film in the foil pouch until you ready to use it. Drugs and Foods to Avoid: Ask your doctor or pharmacist before using any other medicine, including over-the-counter medicines, vitamins, and herbal products. · Do not use this medicine together with apomorphine. · Some medicines may affect how ondansetron works. Tell your doctor if you are using tramadol, diuretics (water pills), or any other medicine for nausea and vomiting. Warnings While Using This Medicine: · Tell your doctor if you are pregnant or breastfeeding, or if you have liver disease, congestive heart failure, heart rhythm problems (such as prolonged QT interval, slow heartbeat), low magnesium or potassium levels, stomach or bowel problems, or phenylketonuria (PKU). · This medicine may cause heart rhythm problems (such as QT prolongation). · This medicine may make you dizzy. Do not drive or do anything else that could be dangerous until you know how this medicine affects you. · Keep all medicine out of the reach of children. Never share your medicine with anyone. Possible Side Effects While Using This Medicine:  
Call your doctor right away if you notice any of these side effects: · Allergic reaction: Itching or hives, swelling in your face or hands, swelling or tingling in your mouth or throat, chest tightness, trouble breathing · Fainting, dizziness, or lightheadedness · Fast, pounding, or uneven heartbeat · Trouble breathing If you notice these less serious side effects, talk with your doctor: · Constipation or diarrhea 
· Headache · Tiredness or weakness If you notice other side effects that you think are caused by this medicine, tell your doctor. Call your doctor for medical advice about side effects. You may report side effects to FDA at 1-229-FDA-4189 © 2017 [address] Information is for End User's use only and may not be sold, redistributed or otherwise used for commercial purposes. The above information is an  only. It is not intended as medical advice for individual conditions or treatments. Talk to your doctor, nurse or pharmacist before following any medical regimen to see if it is safe and effective for you. Please provide this summary of care documentation to your next provider. Signatures-by signing, you are acknowledging that this After Visit Summary has been reviewed with you and you have received a copy. Patient Signature:  ____________________________________________________________ Date:  ____________________________________________________________  
  
[facility] Provider Signature:  ____________________________________________________________ Date:  ____________________________________________________________

## 2018-06-27 NOTE — PERIOP NOTES
TRANSFER - OUT REPORT:    Verbal report given to [facility]) on Morteza Lang  being transferred to [facility](unit) for routine post - op       Report consisted of patients Situation, Background, Assessment and   Recommendations(SBAR). Time Pre op antibiotic [id number]  Anesthesia Stop time: 2412    Information from the following report(s) SBAR, OR Summary, Intake/Output and MAR was reviewed with the receiving nurse. Opportunity for questions and clarification was provided. Is the patient on 02? NO       L/Min        Other     Is the patient on a monitor? NO    Is the nurse transporting with the patient? NO    Surgical Waiting Area notified of patient's transfer from PACU? YES      The following personal items collected during your admission accompanied patient upon transfer:   Dental Appliance: Dental Appliances: None  Vision: Visual Aid: At home, Glasses  Hearing Aid:    Jewelry: Jewelry: None  Clothing: Clothing:  (bag of clothes returned in PACU)  Other Valuables:  Other Valuables: None  Valuables sent to safe:

## 2018-06-27 NOTE — ANESTHESIA POSTPROCEDURE EVALUATION
Post-Anesthesia Evaluation and Assessment    Patient: Morteza Lang MRN: [id number]  SSN: [id number]    YOB: 1965  Age: 45 y.o. Sex: female       Cardiovascular Function/Vital Signs  Visit Vitals    /67 (BP 1 Location: Right arm, BP Patient Position: At rest)    Pulse 86    Temp 36.4 °C (97.5 °F)    Resp 14    Ht 4' 11.25\" (1.505 m)    Wt 88.5 kg (195 lb)    SpO2 98%    BMI 39.05 kg/m2       Patient is status post general anesthesia for Procedure(s):  LAPAROSCOPIC REMOVAL SILASTIC GASTRIC BAND, LAPAROSCOPIC EXTENSIVE LYSIS OF ADHESION,LAPAROSCOPIC HIATAL HERNIA REPAIR WITH MESH, EGD   ESOPHAGOGASTRODUODENOSCOPY (EGD). Nausea/Vomiting: None    Postoperative hydration reviewed and adequate. Pain:  Pain Scale 1: Numeric (0 - 10) (06/27/18 1443)  Pain Intensity 1: 10 (06/27/18 1443)   Managed    Neurological Status:   Neuro (WDL): Within Defined Limits (06/27/18 1315)  Neuro  Neurologic State: Alert (06/27/18 1315)  LUE Motor Response: Purposeful (06/27/18 1315)  LLE Motor Response: Purposeful (06/27/18 1315)  RUE Motor Response: Purposeful (06/27/18 1315)  RLE Motor Response: Purposeful (06/27/18 1315)   At baseline    Mental Status and Level of Consciousness: Arousable    Pulmonary Status:   O2 Device: Room air (06/27/18 1443)   Adequate oxygenation and airway patent    Complications related to anesthesia: None    Post-anesthesia assessment completed.  No concerns    Signed By: Tracee Rios MD     June 27, 2018

## 2018-06-27 NOTE — IP AVS SNAPSHOT
[address] [facility] [place] [address] [place] [address] 
[phone] Patient: Mikael Fontaine MRN: [id number] :1965 A check matty indicates which time of day the medication should be taken. My Medications START taking these medications Instructions Each Dose to Equal  
 Morning Noon Evening Bedtime HYDROcodone-acetaminophen 5-325 mg per tablet Commonly known as:  [place] Your last dose was: Your next dose is: Take 1-2 Tabs by mouth every four (4) hours as needed. Max Daily Amount: 12 Tabs. 1-2 Tab CHANGE how you take these medications Instructions Each Dose to Equal  
 Morning Noon Evening Bedtime  
 ondansetron 4 mg disintegrating tablet Commonly known as:  ZOFRAN ODT What changed:  when to take this Your last dose was: Your next dose is: Take 1 Tab by mouth every eight (8) hours as needed for Nausea. 4 mg CONTINUE taking these medications Instructions Each Dose to Equal  
 Morning Noon Evening Bedtime CALCIUM + D PO Your last dose was: Your next dose is: Take 1 Tab by mouth daily. 1 Tab  
    
   
   
   
  
 gabapentin 300 mg capsule Commonly known as:  NEURONTIN Your last dose was: Your next dose is:    
   
   
 900 mg three (3) times daily. 900 mg  
    
   
   
   
  
 levothyroxine 100 mcg tablet Commonly known as:  SYNTHROID Your last dose was: Your next dose is: TAKE 1 TABLET BY ORAL ROUTE EVERY DAY MONDAY TO SATURDAY, 1.5 TABLET  MULTI-VITAMIN PO Your last dose was: Your next dose is: Take  by mouth. omeprazole 40 mg capsule Commonly known as:  PRILOSEC Your last dose was: Your next dose is: Take 40 mg by mouth every morning. 40 mg  
    
   
   
   
  
 orphenadrine citrate 100 mg sr tablet Commonly known as:  NORFLEX Your last dose was: Your next dose is:    
   
   
 two (2) times a day. PREMPRO 0.625-2.5 mg per tablet Generic drug:  estrogen (conjugated)-medroxyPROGESTERone Your last dose was: Your next dose is:    
   
   
 1 Tab daily. 1 Tab  
    
   
   
   
  
 venlafaxine- mg capsule Commonly known as:  EFFEXOR-XR Your last dose was: Your next dose is: TAKE ONE CAPSULE BY MOUTH EVERY MORNING  
     
   
   
   
  
 VITAMIN B-12 1,000 mcg sublingual tablet Generic drug:  cyanocobalamin Your last dose was: Your next dose is: Take 1,000 mcg by mouth daily. 1000 mcg VITAMIN D2 PO Your last dose was: Your next dose is: Take 2,000 Units by mouth two (2) times a day. 2000 Units ZANTAC 75 75 mg tablet Generic drug:  raNITIdine Your last dose was: Your next dose is: Take 150 mg by mouth nightly. 150 mg Where to Get Your Medications Information on where to get these meds will be given to you by the nurse or doctor. ! Ask your nurse or doctor about these medications HYDROcodone-acetaminophen 5-325 mg per tablet  
 ondansetron 4 mg disintegrating tablet

## 2018-06-28 ENCOUNTER — APPOINTMENT (OUTPATIENT)
Dept: GENERAL RADIOLOGY | Age: 53
DRG: 909 | End: 2018-06-28
Attending: SURGERY
Payer: COMMERCIAL

## 2018-06-28 VITALS
BODY MASS INDEX: 40.44 KG/M2 | WEIGHT: 200.62 LBS | HEIGHT: 59 IN | RESPIRATION RATE: 16 BRPM | DIASTOLIC BLOOD PRESSURE: 58 MMHG | TEMPERATURE: 98.6 F | HEART RATE: 88 BPM | SYSTOLIC BLOOD PRESSURE: 119 MMHG | OXYGEN SATURATION: 92 %

## 2018-06-28 LAB
ANION GAP SERPL CALC-SCNC: 8 MMOL/L (ref 5–15)
BUN SERPL-MCNC: 6 MG/DL (ref 6–20)
BUN/CREAT SERPL: 8 (ref 12–20)
CALCIUM SERPL-MCNC: 8.1 MG/DL (ref 8.5–10.1)
CHLORIDE SERPL-SCNC: 107 MMOL/L (ref 97–108)
CO2 SERPL-SCNC: 25 MMOL/L (ref 21–32)
CREAT SERPL-MCNC: 0.73 MG/DL (ref 0.55–1.02)
ERYTHROCYTE [DISTWIDTH] IN BLOOD BY AUTOMATED COUNT: 13 % (ref 11.5–14.5)
GLUCOSE SERPL-MCNC: 135 MG/DL (ref 65–100)
HCT VFR BLD AUTO: 34.9 % (ref 35–47)
HGB BLD-MCNC: 11.1 G/DL (ref 11.5–16)
MCH RBC QN AUTO: 28.8 PG (ref 26–34)
MCHC RBC AUTO-ENTMCNC: 31.8 G/DL (ref 30–36.5)
MCV RBC AUTO: 90.6 FL (ref 80–99)
NRBC # BLD: 0 K/UL (ref 0–0.01)
NRBC BLD-RTO: 0 PER 100 WBC
PLATELET # BLD AUTO: 195 K/UL (ref 150–400)
PMV BLD AUTO: 9.8 FL (ref 8.9–12.9)
POTASSIUM SERPL-SCNC: 3.7 MMOL/L (ref 3.5–5.1)
RBC # BLD AUTO: 3.85 M/UL (ref 3.8–5.2)
SODIUM SERPL-SCNC: 140 MMOL/L (ref 136–145)
WBC # BLD AUTO: 6.8 K/UL (ref 3.6–11)

## 2018-06-28 PROCEDURE — 74011250637 HC RX REV CODE- 250/637: Performed by: SURGERY

## 2018-06-28 PROCEDURE — 80048 BASIC METABOLIC PNL TOTAL CA: CPT | Performed by: SURGERY

## 2018-06-28 PROCEDURE — 36415 COLL VENOUS BLD VENIPUNCTURE: CPT | Performed by: SURGERY

## 2018-06-28 PROCEDURE — 74011250637 HC RX REV CODE- 250/637: Performed by: PHYSICIAN ASSISTANT

## 2018-06-28 PROCEDURE — 74011250636 HC RX REV CODE- 250/636: Performed by: SURGERY

## 2018-06-28 PROCEDURE — 74011258636 HC RX REV CODE- 258/636: Performed by: SURGERY

## 2018-06-28 PROCEDURE — 74241 XR UPPER GI W KUB/ BA SWALLOW: CPT

## 2018-06-28 PROCEDURE — 74011636320 HC RX REV CODE- 636/320: Performed by: RADIOLOGY

## 2018-06-28 PROCEDURE — 85027 COMPLETE CBC AUTOMATED: CPT | Performed by: SURGERY

## 2018-06-28 RX ORDER — HYDROCODONE BITARTRATE AND ACETAMINOPHEN 5; 325 MG/1; MG/1
1-2 TABLET ORAL
Status: DISCONTINUED | OUTPATIENT
Start: 2018-06-28 | End: 2018-06-28 | Stop reason: HOSPADM

## 2018-06-28 RX ORDER — LEVOTHYROXINE SODIUM 100 UG/1
100 TABLET ORAL
Status: DISCONTINUED | OUTPATIENT
Start: 2018-06-28 | End: 2018-06-28 | Stop reason: HOSPADM

## 2018-06-28 RX ORDER — MORPHINE SULFATE 10 MG/ML
3-5 INJECTION, SOLUTION INTRAMUSCULAR; INTRAVENOUS
Status: DISCONTINUED | OUTPATIENT
Start: 2018-06-28 | End: 2018-06-28 | Stop reason: SDUPTHER

## 2018-06-28 RX ORDER — HYDROCODONE BITARTRATE AND ACETAMINOPHEN 5; 325 MG/1; MG/1
1-2 TABLET ORAL
Qty: 50 TAB | Refills: 0 | Status: SHIPPED | OUTPATIENT
Start: 2018-06-28 | End: 2018-07-17 | Stop reason: ALTCHOICE

## 2018-06-28 RX ORDER — ONDANSETRON 4 MG/1
4 TABLET, ORALLY DISINTEGRATING ORAL
Qty: 30 TAB | Refills: 0 | Status: SHIPPED | OUTPATIENT
Start: 2018-06-28 | End: 2018-07-17 | Stop reason: ALTCHOICE

## 2018-06-28 RX ORDER — GABAPENTIN 300 MG/1
900 CAPSULE ORAL 3 TIMES DAILY
Status: DISCONTINUED | OUTPATIENT
Start: 2018-06-28 | End: 2018-06-28 | Stop reason: HOSPADM

## 2018-06-28 RX ORDER — MORPHINE SULFATE 10 MG/ML
3-5 INJECTION, SOLUTION INTRAMUSCULAR; INTRAVENOUS
Status: DISCONTINUED | OUTPATIENT
Start: 2018-06-28 | End: 2018-06-28

## 2018-06-28 RX ADMIN — HYDROCODONE BITARTRATE AND ACETAMINOPHEN 2 TABLET: 5; 325 TABLET ORAL at 12:09

## 2018-06-28 RX ADMIN — SODIUM CHLORIDE, SODIUM LACTATE, POTASSIUM CHLORIDE, CALCIUM CHLORIDE, AND DEXTROSE MONOHYDRATE 125 ML/HR: 600; 310; 30; 20; 5 INJECTION, SOLUTION INTRAVENOUS at 04:51

## 2018-06-28 RX ADMIN — HYDROCODONE BITARTRATE AND ACETAMINOPHEN 2 TABLET: 5; 325 TABLET ORAL at 15:16

## 2018-06-28 RX ADMIN — GABAPENTIN 900 MG: 300 CAPSULE ORAL at 09:25

## 2018-06-28 RX ADMIN — Medication 10 ML: at 05:37

## 2018-06-28 RX ADMIN — MORPHINE SULFATE 4 MG: 10 INJECTION INTRAVENOUS at 10:03

## 2018-06-28 RX ADMIN — ONDANSETRON 4 MG: 4 TABLET, ORALLY DISINTEGRATING ORAL at 12:12

## 2018-06-28 RX ADMIN — LEVOTHYROXINE SODIUM 100 MCG: 100 TABLET ORAL at 09:26

## 2018-06-28 RX ADMIN — MORPHINE SULFATE 4 MG: 2 INJECTION, SOLUTION INTRAMUSCULAR; INTRAVENOUS at 00:02

## 2018-06-28 RX ADMIN — IOHEXOL 100 ML: 350 INJECTION, SOLUTION INTRAVENOUS at 08:00

## 2018-06-28 RX ADMIN — MORPHINE SULFATE 4 MG: 2 INJECTION, SOLUTION INTRAMUSCULAR; INTRAVENOUS at 03:55

## 2018-06-28 NOTE — PROGRESS NOTES
Reason for Admission:  S/P LAPAROSCOPIC REMOVAL marlex mesh GASTRIC BAND, LAPAROSCOPIC EXTENSIVE LYSIS OF ADHESION,LAPAROSCOPIC HIATAL HERNIA REPAIR WITH MESH, EGD, ESOPHAGOGASTRODUODENOSCOPY on 6/27/18                 RRAT Score:  5                   Plan for utilizing home health:   Not indicated at this time                       Likelihood of Readmission:  Low                          Transition of Care Plan:  CM met with patient and daughter Gely to discuss discharge planning. Patient lives with her boyfriend Juanito Wick [phone] in [place], [place], she is independent without any assistive devices. She sees her PCP as needed and uses her local [facility] in [place] for prescriptions. Her boyfriend will provide transportation home and she did not voice any discharge barriers. CM will follow as follows. Sandeep Sampson MSA, RN, CRM. Care Management Interventions  PCP Verified by CM: Yes  Palliative Care Criteria Met (RRAT>21 & CHF Dx)?: No  Mode of Transport at Discharge: Other (see comment) (Private car)  Transition of Care Consult (CM Consult): Discharge Planning  MyChart Signup: No  Discharge Durable Medical Equipment: No  Health Maintenance Reviewed: Yes  Physical Therapy Consult: No  Occupational Therapy Consult: No  Speech Therapy Consult: No  Current Support Network:  Other (Lives with her shona)  Confirm Follow Up Transport: Friends  Plan discussed with Pt/Family/Caregiver: Yes  Discharge Location  Discharge Placement: Home with family assistance

## 2018-06-28 NOTE — PROGRESS NOTES
Still feels sore but no issue taking po, drinking water and crystal light, she took 5 ounces hourly x 2 and has been taking ice chips and water freely  No n/v  She is ok for DC to home this evening  Instructions reviewed with pt including diet, pain management, wound care, activity, follow up is in place  Future Appointments  Date Time Provider [place] Do   7/10/2018 8:20 AM Dorene Adams NP [facility] [address] [facility]   7/25/2018 9:20 AM Kamryn Sapp NP [facility]   8/15/2018 9:20 AM Kamryn Sapp NP [address] [place], [place]

## 2018-06-28 NOTE — DISCHARGE SUMMARY
Physician Discharge Summary     Patient ID:  Jason Putnam  [id number]  22 y.o.  1965    Allergies: Aspirin and Other medication    Admit Date: 6/27/2018    Discharge Date: 6/28/2018    * Admission Diagnoses: MORBID OBESITY  GERD (gastroesophageal reflux disease)    * Discharge Diagnoses:    Hospital Problems as of 6/28/2018  Date Reviewed: 6/27/2018          Codes Class Noted - Resolved POA    GERD (gastroesophageal reflux disease) ICD-10-CM: K21.9  ICD-9-CM: 530.81  6/27/2018 - Present Unknown               Admission Condition: Good    * Discharge Condition: good    * Procedures: Procedure(s):  LAPAROSCOPIC REMOVAL SILASTIC GASTRIC BAND, LAPAROSCOPIC EXTENSIVE LYSIS OF ADHESION,LAPAROSCOPIC HIATAL HERNIA REPAIR WITH MESH, EGD   ESOPHAGOGASTRODUODENOSCOPY (EGD)    * Hospital Course:   Normal hospital course for this procedure. UGI completed indicating no leak and no obstruction. Pt started on clears and advanced to bariatric fulls without issue  Seen by Dietician to review diet and vitamins  Transitioned to oral analgesics and pain well controlled    DC to home  POD 1 doing well with follow up appointment in place        Consults: nutrition     Significant Diagnostic Studies: UGI completed indicating no leak and no obstruction. * Disposition: Home    Discharge Medications:   Current Discharge Medication List      START taking these medications    Details   HYDROcodone-acetaminophen (NORCO) 5-325 mg per tablet Take 1-2 Tabs by mouth every four (4) hours as needed. Max Daily Amount: 12 Tabs. Qty: 50 Tab, Refills: 0    Associated Diagnoses: Gastroesophageal reflux disease without esophagitis         CONTINUE these medications which have CHANGED    Details   ondansetron (ZOFRAN ODT) 4 mg disintegrating tablet Take 1 Tab by mouth every eight (8) hours as needed for Nausea.   Qty: 30 Tab, Refills: 0         CONTINUE these medications which have NOT CHANGED    Details   levothyroxine (SYNTHROID) 100 mcg tablet TAKE 1 TABLET BY ORAL ROUTE EVERY DAY MONDAY TO SATURDAY, 1.5 TABLET SUNDAY  Refills: 1      gabapentin (NEURONTIN) 300 mg capsule 900 mg three (3) times daily. venlafaxine-SR (EFFEXOR-XR) 150 mg capsule TAKE ONE CAPSULE BY MOUTH EVERY MORNING  Refills: 1      orphenadrine citrate (NORFLEX) 100 mg sr tablet two (2) times a day. PREMPRO 0.625-2.5 mg per tablet 1 Tab daily. omeprazole (PRILOSEC) 40 mg capsule Take 40 mg by mouth every morning. CALCIUM CARBONATE/VITAMIN D3 (CALCIUM + D PO) Take 1 Tab by mouth daily. MULTI-VITAMIN PO Take  by mouth. ERGOCALCIFEROL, VITAMIN D2, (VITAMIN D PO) Take 2,000 Units by mouth two (2) times a day. raNITIdine (ZANTAC 75) 75 mg tablet Take 150 mg by mouth nightly. cyanocobalamin (VITAMIN B-12) 1,000 mcg sublingual tablet Take 1,000 mcg by mouth daily. * Follow-up Care/Patient Instructions:   Activity: No driving while on analgesics and No heavy lifting for 4 weeks  Diet: Bariatric full liquids for 2 weeks post op  Wound Care: Keep wound clean and dry    Future Appointments  Date Time Provider [facility]   7/10/2018 8:20 AM Leeta Severe, NP [place] [address]   7/25/2018 9:20 AM ELMA Cee   8/15/2018 9:20 AM ELMA Cee         Follow-up Information     Follow up With Details Comments Contact Info    Reginald Ledesma MD   Patient can only remember the practice name and not the physician          Follow-up tests/labs na    Signed:  JUSTINO Kidd  6/28/2018  2:21 PM

## 2018-06-28 NOTE — PROGRESS NOTES
Bedside shift change report given to Amari (oncoming nurse) by Kaiden Wiggins (offgoing nurse). Report included the following information SBAR, Kardex, Procedure Summary, Intake/Output, MAR, Recent Results, Med Rec Status and Alarm Parameters .

## 2018-06-28 NOTE — PROGRESS NOTES
Problem: Patient Education: Go to Patient Education Activity  Goal: Patient/Family Education  Outcome: Resolved/Met Date Met: 06/28/18  NUTRITION     Chart reviewed. Post-op bariatric diet instruction completed. Will gladly follow up for additional questions as needed. Thank you.      Jamaica Diaz RD

## 2018-06-28 NOTE — PROGRESS NOTES
Discharge instructions reviewed with pt, opportunity for questions provided. Pt verbalized understanding and was discharged home. Signature pad in room did not work, pt signed discharge paper and it was put in pt's chart.

## 2018-06-28 NOTE — PROGRESS NOTES
Surgery Progress Note    Admit Date: 6/27/2018      Subjective:      Pt complains of some post op pain \"I fee very sore\", no acute issues overnight, she slept on and off, voiding well  Pts pain present - adequately treated. No SOB. No CP. Pt is ambulating. Patient 's current diet is ice chips, no issues taking po last night, .. Pt reports  nausea and no vomiting. Pt reports no fever or chills    Bowel Movements: None        Objective:     Patient Vitals for the past 8 hrs:   BP Temp Pulse Resp SpO2   06/28/18 0353 106/52 98.2 °F (36.8 °C) 89 16 96 %   06/27/18 2316 109/58 98.5 °F (36.9 °C) 82 16 95 %        06/26 1901 - 06/28 0700  In: 3722.9 [I.V.:3722.9]  Out: 450 [Urine:400]ip  Physical Exam:    General: alert, cooperative, no distress  Cardiac: normal S1 and S2  Lungs: Normal chest wall and respirations. Clear to auscultation.   Abdomen: soft, nondistended, tenderness mild - in the upper abdomen, without guarding, without rebound  Wounds:clean, dry, no drainage  Neuro: alert, oriented x 3, no defects noted in general exam.  Extremities: no edema      CBC: Lab Results   Component Value Date/Time    WBC 6.8 06/28/2018 12:11 AM    RBC 3.85 06/28/2018 12:11 AM    HGB 11.1 (L) 06/28/2018 12:11 AM    HCT 34.9 (L) 06/28/2018 12:11 AM    PLATELET 370 36/70/0209 12:11 AM     BMP: Lab Results   Component Value Date/Time    Glucose 135 (H) 06/28/2018 12:11 AM    Sodium 140 06/28/2018 12:11 AM    Potassium 3.7 06/28/2018 12:11 AM    Chloride 107 06/28/2018 12:11 AM    CO2 25 06/28/2018 12:11 AM    BUN 6 06/28/2018 12:11 AM    Creatinine 0.73 06/28/2018 12:11 AM    Calcium 8.1 (L) 06/28/2018 12:11 AM     CMP:  Lab Results   Component Value Date/Time    Glucose 135 (H) 06/28/2018 12:11 AM    Sodium 140 06/28/2018 12:11 AM    Potassium 3.7 06/28/2018 12:11 AM    Chloride 107 06/28/2018 12:11 AM    CO2 25 06/28/2018 12:11 AM    BUN 6 06/28/2018 12:11 AM    Creatinine 0.73 06/28/2018 12:11 AM    Calcium 8.1 (L) 06/28/2018 12:11 AM    Anion gap 8 06/28/2018 12:11 AM    BUN/Creatinine ratio 8 (L) 06/28/2018 12:11 AM    Alk.  phosphatase 73 05/25/2018 10:39 AM    Protein, total 7.0 05/25/2018 10:39 AM    Albumin 3.7 05/25/2018 10:39 AM    Globulin 3.3 05/25/2018 10:39 AM    A-G Ratio 1.1 05/25/2018 10:39 AM       Radiology review: UGI is pending         Assessment:   Pt is POD #1 s/p Procedure(s):  LAPAROSCOPIC REMOVAL marlex mesh GASTRIC BAND, LAPAROSCOPIC EXTENSIVE LYSIS OF ADHESION,LAPAROSCOPIC HIATAL HERNIA REPAIR WITH MESH, EGD   ESOPHAGOGASTRODUODENOSCOPY (EGD)      Plan:   Diet: after UGI begin bariatric fulls   Activity: walk in [place]  Pain management  GI and DVT prophylaxis  Meds: resume her gabapentin today   Labs: labs are reviewed, up to date and  Unremarkable post op   Pt is voiding well   Possible DC home later today if adequate po intake and pain is well controlled with oral analgesics   Further plan per Dr. Nico Mcdermott PA-C    Addendum  UGI completed and films reviewed, no leak or obstruction with good forward flow of contrast/ no reflux appreciated   Ok for bariatric liquid diet   JUSTINO Parnell

## 2018-06-28 NOTE — PROGRESS NOTES
Problem: Falls - Risk of  Goal: *Absence of Falls  Document Renzo Fall Risk and appropriate interventions in the flowsheet.    Outcome: Progressing Towards Goal  Fall Risk Interventions:            Medication Interventions: Patient to call before getting OOB

## 2018-06-29 ENCOUNTER — TELEPHONE (OUTPATIENT)
Dept: SURGERY | Age: 53
End: 2018-06-29

## 2018-06-29 NOTE — TELEPHONE ENCOUNTER
----- Message from Yajaira Hilton LPN sent at 6/19/9186  2:39 PM EDT -----  Regarding: FW: ED pt DC to home Thursday 6/28, 2 day post op call      ----- Message -----     From: JUSTINO Merritt     Sent: 6/28/2018   2:23 PM       To: Boston Blackwood  Subject: ED pt DC to home Thursday 6/28                   Bariatric Discharge Notice for Follow Up Call    Admit Date: 6/27    Pt is POD #1 s/p laparoscopic repair of hiatal hernia with mesh, removal of mesh gastric band  surgery and is being discharged to home today Thurs 6/27 after a routine post op course.      Thanks,  Quyen Vaughn PA-C

## 2018-06-29 NOTE — TELEPHONE ENCOUNTER
I called the patient and I left her a voice mail to call me back in regards to her 2 day post op call.

## 2018-07-02 ENCOUNTER — TELEPHONE (OUTPATIENT)
Dept: SURGERY | Age: 53
End: 2018-07-02

## 2018-07-02 NOTE — TELEPHONE ENCOUNTER
Bariatric Post-Operative Phone Calls: 48 hour phone call    Diet:Question of any nausea and/or vomiting. Protein intake (goal is 60 grams of protein daily)   Poor____Fair____Good__x__Great____     Comment:______________________________________________________________      ______________________________________________________________________    Hydration:Less than 32 ounces of water daily is fair to poor (Goal is 64 ounces per day)   Poor____ Fair____ Good____Great__x__    Comment:______________________________________________________________    ______________________________________________________________________      Ambulation:( walking at least 3 x week, for 15- 20 minutes)     Poor______ Fair______ Good______     Great__x____ Comment:__________________________________________________    ______________________________________________________________________      Urine Color: Question of any odor and color(should be rowdy, pale, and clear) Dark______ Amber______ Pale___x___      Clear______ Comment:___________________________________________________                           ________________________________________________________________    Bowel movements: Question of any constipation- haven't had any bowel movements for more than 3 days. This could be related to protein intake and/or narcotic pain medication usage. Comment:                                                                                                                       Having regular bowel movements       Pain: Left sided abdominal pain is normal (should be less than 3)  Question if pain medication is helpful.  10___ 9___ 8___ 7___ 6___ 5__x_ 4___ 3___     2___1___0___Comment:_________________________________________________    ______________________________________________________________________      Incision: (No redness, pain, swelling or fever) Healing Well___x___     Healed______Redness_________ Pain_________ Swelling_________ Fever__________(greater than 101 needs evaluation)    Comment:____________________________________________________________    ______________________________________________________________________  Use of incentive spirometer: Yes__x__       No           Next Appointment:__7/17/18____________                 Support Group: Yes______No____x__    Additional Comments:____________________________________________________________    ____________________________________________________________________      If more than one parameter is not met or considered poor, nurse needs to discuss with provider recommend for patient to be seen in the office as soon as possible or refer to the provider for follow-up. Reinforce to patient to use bariatric educational booklet as guide. It is appropriate to refer patient to the nutritionist to discuss more in detail of diet and nutrition.

## 2018-07-02 NOTE — TELEPHONE ENCOUNTER
----- Message from Nita Foreman LPN sent at 1/07/6710  2:39 PM EDT -----  Regarding: FW: ED pt DC to home Thursday 6/28, 2 day post op call      ----- Message -----     From: JUSTINO Carter     Sent: 6/28/2018   2:23 PM       To: Boston Blackwood  Subject: ED pt DC to home Thursday 6/28                   Bariatric Discharge Notice for Follow Up Call    Admit Date: 6/27    Pt is POD #1 s/p laparoscopic repair of hiatal hernia with mesh, removal of mesh gastric band  surgery and is being discharged to home today Thurs 6/27 after a routine post op course.      Thanks,  Gordon Mckeon PA-C

## 2018-07-17 ENCOUNTER — OFFICE VISIT (OUTPATIENT)
Dept: SURGERY | Age: 53
End: 2018-07-17

## 2018-07-17 VITALS
HEART RATE: 65 BPM | DIASTOLIC BLOOD PRESSURE: 68 MMHG | TEMPERATURE: 98.1 F | OXYGEN SATURATION: 99 % | RESPIRATION RATE: 20 BRPM | BODY MASS INDEX: 37.5 KG/M2 | SYSTOLIC BLOOD PRESSURE: 110 MMHG | WEIGHT: 186 LBS | HEIGHT: 59 IN

## 2018-07-17 DIAGNOSIS — Z09 FOLLOW-UP EXAMINATION FOLLOWING SURGERY: Primary | ICD-10-CM

## 2018-07-17 DIAGNOSIS — K91.2 POSTOPERATIVE INTESTINAL MALABSORPTION: ICD-10-CM

## 2018-07-17 NOTE — PATIENT INSTRUCTIONS
Clear Proteins:  Premiere Clear, Atkins LIFT, Protein H2O, Isopur     What you need to know:  1. Advance your diet to soft foods. Follow the handout that you were given today in the office. 2.  Take the recommended vitamins daily  3. No lifting greater than 20 lbs. 4.  You can do light jogging and walking. 5  Follow up in 2 weeks. 6.  You may go into a pool. 7.  If you are not able to tolerate liquids or soft foods. Please call our office. [phone]  8. If you have vomiting and persistent epigastric pain or chest pain. You should call our office, the doctor on-call or go to the emergency room. What to do if you are constipated: You may  take Milk of Magnesia. Take 2 Tablespoons followed by 16 oz of water then 2 hours later take another 2 tablespoons. If  milk of magnesia does not work then take [place] or Miralax over the counter. Keep in mind that the Benefiber or Miralax may take a day or two to work. If all of the above do not work try a Fleets enema and follow the directions on the box. Soft and Mushy: Phase 1    Below is a list of basic items to purchase for the first phase of the   soft mushy diet. Your surgeon or nurse practitioner will inform you when it is okay   to advance to the next phase. Soft and Mushy Foods: Prepare food to the appropriate texture. ? Everything on clear and full liquid diet  ? Applesauce (no sugar added)  ? Hot & cold cereals (Cream of Wheat, Plain Cheerios®, Special K with protein®, plain oatmeal, grits)  ? Frozen or canned vegetables (carrots, acorn squash, butternut squash, string beans, spinach, broccoli, cauliflower - florets only!)   ? Canned fruit (in natural juice or with Splenda®)  ? Fat-free, cholesterol-free egg substitute (P)  ? Low-fat or fat-free cottage cheese (P)  ? Low-fat or fat-free yogurt (P)  ? Low-fat or fat-free [place] yogurt (P)  ? Fat-free milk or 1% milk (P)  ? Lactaid fat-free or 1% low fat milk (P)  ?  Low-fat well-cooked/soft beans (the consistency of refried beans) (P)  ? No sugar added, low fat pudding (no pistachio or other flavor containing nuts)  ? low-fat cream soups  ? Low-fat chicken noodle or chicken rice soup (P)  ? Sugar-free fudgesicles  ? Sugar-free cocoa  ? Fat free whipped or mashed potatoes   ? Herbs and spices  ? Lite butter, margarine, canola oil, olive oil, reduced-fat or fat-free mayonnaise, reduced-fat or fat-free salad dressing, reduced-fat or fat-free cream cheese, reduced-fat or fat-free sour cream.        P designates food sources of protein. Include a protein at each meal.     If a food does not contain protein, you may want to consider adding protein powder to the food to give it extra protein. For example, mix protein powder in with the following: oatmeal, mashed potatoes, sugar-free pudding, sugar-free gelatin (see recipes in book), no-sugar-added applesauce. Soft Mushy Diet: Phase 1  Time Meal or Snack Soft/Mushy Food Amount (ounces) Protein  (g) Supplement   6:30 am Sip on Fluids Sip on non-carbonated, calorie-free, no sugar added liquids. 8 oz   0 g Take Multivitamin containing 18 mg ferrous sulfate (iron)    7:00 am   Stop drinking fluids 30 minutes before breakfast   7:30 am Breakfast ½ cup sugar-free oatmeal with 1 scoop of protein powder. Add cinnamon, nutmeg, artificial sweeteners as desired for flavor. 4 oz    20-25 g    9:00 Snack  (optional) High Protein Gelatin (see recipe in book) 4oz 10 g    11:30 am Stop drinking liquids 30 minutes before lunch   12:00 pm Lunch Sip low-fat cream of potato soup or low-fat cream of chicken soup mixed with 1 scoop of protein powder 8 oz soup 25 g Take 400 mg calcium citrate   2:00 Snack  (optional) ½ cup high protein pudding (see recipe in book)   or   ½ cup low-fat cottage cheese or yogurt. Can also add protein powder as needed.   4 oz 14 g    or    5 g      3:00 - 5:30 pm   Sip on Fluids   Sip on non-carbonated, calorie-free, no sugar added liquids. 24 - 32 oz   0 g   Take 400 mg calcium citrate. 6:00 pm Dinner ¼ cup low-fat, well cooked beans (ex. black beans, low-fat refried beans)  ¼ cup no-sugar-added applesauce 4 oz 3.5 g Take 400 mg of calcium citrate. 7:00 - 10:00 pm Sip on Fluids Sip on non-carbonated, no sugar added liquids as needed  16-24 oz 0 g Take Multivitamin with 18 mg ferrous sulfate   Total:  80 oz clear fluids 63-77  grams 2 Multivitamins with 18 mg ferrous sulfate, 7949-1455 mg calcium citrate             What you need to know:  1 . Advance your diet to moist meats. Follow the handout that you were given today in the office. 2. Take the recommended vitamins daily  3 No lifting greater than 40 lbs. 4. You can do light jogging, moderate walking and a recumbent bike. 5 Follow up in 2 weeks. 6. You may go into a pool. 7. If you are not able to tolerate liquids, soft foods or moist meats. Please call our office. [phone]  8. If you have vomiting and persistent epigastric pain or chest pain. You should call our office, the doctor on-call or go to the emergency room. What to do if you are constipated: You may  take Milk of Magnesia. Take 2 Tablespoons followed by 16 oz of water then 2 hours later take another 2 tablespoons. If  milk of magnesia does not work then take [place] or Miralax over the counter. Keep in mind that the Benefiber or Miralax may take a day or two to work. If all of the above do not work try a Fleets enema and follow the directions on the box. Shopping List [employer]     Soft Mushy Diet: Phase 2 - Moist Meats     Below is a list of moist meats that you can now introduce into your diet. Moist Meats: Prepare food to the appropriate texture using low-fat cooking   methods       ? Tuna packed in water (strain before eating)  ? White flaky fish (lashay, cod, flounder, tilapia, salmon)   ? White chicken breast packed in water (strain before eating)  ?  96-99% fat free thinly sliced deli meat (ham, turkey, roast beef)  ? Fat free non-stick spray  ? Silken Tofu  ? Low-fat or vegetarian refried beans  ? Well-cooked beans and lentils  ? Skinless turkey or chicken (prepare to a soft texture)  ? 93% lean pureed beef (round or sirloin only)  ? Lean pork (cooked until very tender, cut into small pieces)  ? Eggs (preferable egg whites)  ? Egg substitutes  ? Herbs and spices  ? Lite butter, margarine, canola oil, olive oil, reduced-fat or fat-free ramon, reduced-fat or fat-free salad dressing, reduced-fat or fat-free cream cheese, reduced-fat or fat-free sour cream, lemon juice, salt, pepper, mustard, ketchup, salsa. See patient handbook for more low-fat cooking ideas. ? Be sure to use moist methods of cooking. Avoid microwaving meats because it can dry out the food making it harder to tolerate. Soft Mushy Diet: Phase 2  Time Meal or Snack Soft/Mushy Food Amount (ounces) Protein  (g) Supplement   6:30 am Sip on Fluids Sip on non-carbonated, calorie-free, no sugar added liquids. 8 oz   0 g Take Multivitamin containing 18 mg ferrous sulfate (iron)    7:00 am   Stop drinking fluids 30 minutes before breakfast   7:30 am Breakfast ¼ cup soft scrambled egg or egg substitute   ¼ cup canned fruit (packed in natural juice, strained)  4 oz    7 g    9:00 Snack  (optional) ½ cup low-fat or fat-free yogurt   or   ½ cup cottage cheese  4oz 4g  or  14 g    11:30 am Stop drinking liquids 30 minutes before lunch   12:00 pm Lunch ¼ cup low-fat or lean deli meat  with  ¼ well cooked green beans 4 oz  14 g Take 400 mg calcium citrate   2:00 Snack  (optional) ½ cup high protein, sugar-free pudding with 1 scoop added protein powder (see recipe in book). 4 oz 14 g      3:00 - 5:30 pm   Sip on Fluids   Sip on non-carbonated, calorie-free, no sugar added liquids. 24 - 32 oz   0 g   Take 400 mg calcium citrate.    6:00 pm Dinner ¼ cup soft/flaky fish (tilapia, flounder, tuna)  ¼ cup mashed potatoes or pureed cauliflower mashed potatoes (consider adding protein powder)  4 oz 14 g Take 400 mg of calcium citrate.    7:00 - 10:00 pm Sip on Fluids Sip on non-carbonated, no sugar added liquids as needed  16-24 oz 0 g Take Multivitamin with 18 mg ferrous sulfate   Total:  64 oz fluids 63  grams 2 Multivitamins with 18 mg ferrous sulfate, 0426-2014 mg calcium citrate

## 2018-07-17 NOTE — PROGRESS NOTES
1. Have you been to the ER, urgent care clinic since your last visit? Hospitalized since your last visit? No    2. Have you seen or consulted any other health care providers outside of the [address] [place] [address] since your last visit? Include any pap smears or colon screening.  No

## 2018-07-17 NOTE — MR AVS SNAPSHOT
[address] [address] 
[phone] Patient: Sanya Yap MRN: [id number] :[id number] Visit Information Date & Time Provider Department Dept. Phone Encounter #  
 2018  8:20 AM Debby Griggs NP [facility] 22 [phone] [id number] Your Appointments 2018  9:20 AM  
POST OP 10 MIN with Early Form, ELMA  
[facility] 22 [phone] (Huan Kit) Appt Note: po/removal of ring [address] [phone]  
[facility] [phone]  
  
    
 8/15/2018  9:20 AM  
POST OP 10 MIN with Sharath Thomas NP  
[facility] 22 168 (Huan Brgrady) Appt Note: po/removal of ring [address] [phone] [id number] [phone] Upcoming Health Maintenance Date Due Hepatitis C Screening 1965 DTaP/Tdap/Td series (1 - Tdap) 10/17/1984 PAP AKA CERVICAL CYTOLOGY 10/17/1984 BREAST CANCER SCRN MAMMOGRAM 10/17/2013 FOBT Q 1 YEAR AGE 50-75 10/17/2013 Influenza Age 5 to Adult 2018 Allergies as of 2018  Review Complete On: 2018 By: Emy Williamson LPN Severity Noted Reaction Type Reactions Aspirin  2012    Other (comments) Due to GBP surgery Other Medication  2012    Other (comments) Steroids, can't take due to Gastric Bypass surgery Current Immunizations  Never Reviewed No immunizations on file. Not reviewed this visit Vitals BP Pulse Temp Resp Height(growth percentile) Weight(growth percentile) 110/68 65 98.1 °F (36.7 °C) 20 4' 11\" (1.499 m) 186 lb (84.4 kg) SpO2 BMI OB Status Smoking Status 99% 37.57 kg/m2 Postmenopausal Former Smoker Vitals History BMI and BSA Data  Body Mass Index Body Surface Area  
 37.57 kg/m 2 1.87 m 2  
  
 Preferred Pharmacy Pharmacy Name Phone [facility] #[id number] Singh [phone] Your Updated Medication List  
  
   
This list is accurate as of 7/17/18  8:38 AM.  Always use your most recent med list.  
  
  
  
  
 CALCIUM + D PO Take 1 Tab by mouth daily. gabapentin 300 mg capsule Commonly known as:  NEURONTIN  
900 mg three (3) times daily. HYDROcodone-acetaminophen 5-325 mg per tablet Commonly known as:  March Castles Take 1-2 Tabs by mouth every four (4) hours as needed. Max Daily Amount: 12 Tabs. levothyroxine 100 mcg tablet Commonly known as:  SYNTHROID  
TAKE 1 TABLET BY ORAL ROUTE EVERY DAY MONDAY TO SATURDAY, 1.5 TABLET SUNDAY MULTI-VITAMIN PO Take  by mouth. omeprazole 40 mg capsule Commonly known as:  PRILOSEC Take 40 mg by mouth every morning. ondansetron 4 mg disintegrating tablet Commonly known as:  ZOFRAN ODT Take 1 Tab by mouth every eight (8) hours as needed for Nausea. orphenadrine citrate 100 mg sr tablet Commonly known as:  NORFLEX  
two (2) times a day. PREMPRO 0.625-2.5 mg per tablet Generic drug:  estrogen (conjugated)-medroxyPROGESTERone 1 Tab daily. venlafaxine- mg capsule Commonly known as:  EFFEXOR-XR  
TAKE ONE CAPSULE BY MOUTH EVERY MORNING  
  
 VITAMIN B-12 1,000 mcg sublingual tablet Generic drug:  cyanocobalamin Take 1,000 mcg by mouth daily. VITAMIN D2 PO Take 2,000 Units by mouth two (2) times a day. ZANTAC 75 75 mg tablet Generic drug:  raNITIdine Take 150 mg by mouth nightly. Patient Instructions Clear Proteins:  Premiere Clear, Atkins LIFT, Protein H2O, Isopur What you need to know: 1. Advance your diet to soft foods. Follow the handout that you were given today in the office. 2.  Take the recommended vitamins daily 3. No lifting greater than 20 lbs. 4.  You can do light jogging and walking. 5  Follow up in 2 weeks. 6.  You may go into a pool. 7.  If you are not able to tolerate liquids or soft foods. Please call our office. [phone] 
8. If you have vomiting and persistent epigastric pain or chest pain. You should call our office, the doctor on-call or go to the emergency room. What to do if you are constipated: You may  take Milk of Magnesia. Take 2 Tablespoons followed by 16 oz of water then 2 hours later take another 2 tablespoons. If  milk of magnesia does not work then take [place] or Miralax over the counter. Keep in mind that the Benefiber or Miralax may take a day or two to work. If all of the above do not work try a Fleets enema and follow the directions on the box. Soft and Mushy: Phase 1 Below is a list of basic items to purchase for the first phase of the  
soft mushy diet. Your surgeon or nurse practitioner will inform you when it is okay  
to advance to the next phase. Soft and Mushy Foods: Prepare food to the appropriate texture. ? Everything on clear and full liquid diet ? Applesauce (no sugar added) ? Hot & cold cereals (Cream of Wheat, Plain Cheerios®, Special K with protein®, plain oatmeal, grits) ? Frozen or canned vegetables (carrots, acorn squash, butternut squash, string beans, spinach, broccoli, cauliflower  florets only!) ? Canned fruit (in natural juice or with Splenda®) ? Fat-free, cholesterol-free egg substitute (P) ? Low-fat or fat-free cottage cheese (P) ? Low-fat or fat-free yogurt (P) ? Low-fat or fat-free [place] yogurt (P) ? Fat-free milk or 1% milk (P) ? Lactaid fat-free or 1% low fat milk (P) ? Low-fat well-cooked/soft beans (the consistency of refried beans) (P) ? No sugar added, low fat pudding (no pistachio or other flavor containing nuts) ? low-fat cream soups ? Low-fat chicken noodle or chicken rice soup (P) ? Sugar-free fudgesicles ? Sugar-free cocoa ? Fat free whipped or mashed potatoes ? Herbs and spices ? Lite butter, margarine, canola oil, olive oil, reduced-fat or fat-free mayonnaise, reduced-fat or fat-free salad dressing, reduced-fat or fat-free cream cheese, reduced-fat or fat-free sour cream.  
 
 
 P designates food sources of protein. Include a protein at each meal.  
 
If a food does not contain protein, you may want to consider adding protein powder to the food to give it extra protein. For example, mix protein powder in with the following: oatmeal, mashed potatoes, sugar-free pudding, sugar-free gelatin (see recipes in book), no-sugar-added applesauce. Soft Mushy Diet: Phase 1 Time Meal or Snack Soft/Mushy Food Amount (ounces) Protein 
(g) Supplement 6:30 am Sip on Fluids Sip on non-carbonated, calorie-free, no sugar added liquids. 8 oz 
 0 g Take Multivitamin containing 18 mg ferrous sulfate (iron) 7:00 am   Stop drinking fluids 30 minutes before breakfast  
7:30 am Breakfast ½ cup sugar-free oatmeal with 1 scoop of protein powder. Add cinnamon, nutmeg, artificial sweeteners as desired for flavor. 4 oz  
 20-25 g   
9:00 Snack (optional) High Protein Gelatin (see recipe in book) 4oz 10 g   
11:30 am Stop drinking liquids 30 minutes before lunch 12:00 pm Lunch Sip low-fat cream of potato soup or low-fat cream of chicken soup mixed with 1 scoop of protein powder 8 oz soup 25 g Take 400 mg calcium citrate 2:00 Snack (optional) ½ cup high protein pudding (see recipe in book) or ½ cup low-fat cottage cheese or yogurt. Can also add protein powder as needed. 4 oz 14 g 
 
or 
 
5 g   
 
3:00  5:30 pm  
Sip on Fluids Sip on non-carbonated, calorie-free, no sugar added liquids. 24 - 32 oz  
0 g Take 400 mg calcium citrate. 6:00 pm Dinner ¼ cup low-fat, well cooked beans (ex. black beans, low-fat refried beans) ¼ cup no-sugar-added applesauce 4 oz 3.5 g Take 400 mg of calcium citrate. 7:00 - 10:00 pm Sip on Fluids Sip on non-carbonated, no sugar added liquids as needed  16-24 oz 0 g Take Multivitamin with 18 mg ferrous sulfate Total:  80 oz clear fluids 63-77 
grams 2 Multivitamins with 18 mg ferrous sulfate, 9477-9318 mg calcium citrate What you need to know: 
1 . Advance your diet to moist meats. Follow the handout that you were given today in the office. 2. Take the recommended vitamins daily 3 No lifting greater than 40 lbs. 4. You can do light jogging, moderate walking and a recumbent bike. 5 Follow up in 2 weeks. 6. You may go into a pool. 7. If you are not able to tolerate liquids, soft foods or moist meats. Please call our office. [phone] 
8. If you have vomiting and persistent epigastric pain or chest pain. You should call our office, the doctor on-call or go to the emergency room. What to do if you are constipated: You may  take Milk of Magnesia. Take 2 Tablespoons followed by 16 oz of water then 2 hours later take another 2 tablespoons. If  milk of magnesia does not work then take [place] or Miralax over the counter. Keep in mind that the Benefiber or Miralax may take a day or two to work. If all of the above do not work try a Fleets enema and follow the directions on the box. Shopping List [place] Soft Mushy Diet: Phase 2 - Moist Meats Below is a list of moist meats that you can now introduce into your diet. Moist Meats: Prepare food to the appropriate texture using low-fat cooking  
methods ? Tuna packed in water (strain before eating) ? White flaky fish (lashay, cod, flounder, tilapia, salmon) ? White chicken breast packed in water (strain before eating) ? 96-99% fat free thinly sliced deli meat (ham, turkey, roast beef) ? Fat free non-stick spray ? Silken Tofu ? Low-fat or vegetarian refried beans ? Well-cooked beans and lentils ? Skinless turkey or chicken (prepare to a soft texture) ? 93% lean pureed beef (round or sirloin only) ? Lean pork (cooked until very tender, cut into small pieces) ? Eggs (preferable egg whites) ? Egg substitutes ? Herbs and spices ? Lite butter, margarine, canola oil, olive oil, reduced-fat or fat-free ramon, reduced-fat or fat-free salad dressing, reduced-fat or fat-free cream cheese, reduced-fat or fat-free sour cream, lemon juice, salt, pepper, mustard, ketchup, salsa. See patient handbook for more low-fat cooking ideas. ? Be sure to use moist methods of cooking. Avoid microwaving meats because it can dry out the food making it harder to tolerate. Soft Mushy Diet: Phase 2 Time Meal or Snack Soft/Mushy Food Amount (ounces) Protein 
(g) Supplement 6:30 am Sip on Fluids Sip on non-carbonated, calorie-free, no sugar added liquids. 8 oz 
 0 g Take Multivitamin containing 18 mg ferrous sulfate (iron) 7:00 am   Stop drinking fluids 30 minutes before breakfast  
7:30 am Breakfast ¼ cup soft scrambled egg or egg substitute ¼ cup canned fruit (packed in natural juice, strained)  4 oz  
 7 g   
9:00 Snack (optional) ½ cup low-fat or fat-free yogurt  
or ½ cup cottage cheese  4oz 4g 
or 14 g   
11:30 am Stop drinking liquids 30 minutes before lunch 12:00 pm Lunch ¼ cup low-fat or lean deli meat 
with ¼ well cooked green beans 4 oz  14 g Take 400 mg calcium citrate 2:00 Snack (optional) ½ cup high protein, sugar-free pudding with 1 scoop added protein powder (see recipe in book). 4 oz 14 g   
 
3:00  5:30 pm  
Sip on Fluids Sip on non-carbonated, calorie-free, no sugar added liquids. 24 - 32 oz  
0 g Take 400 mg calcium citrate. 6:00 pm Dinner ¼ cup soft/flaky fish (tilapia, flounder, tuna) ¼ cup mashed potatoes or pureed cauliflower mashed potatoes (consider adding protein powder)  4 oz 14 g Take 400 mg of calcium citrate.   
7:00 - 10:00 pm Sip on Fluids Sip on non-carbonated, no sugar added liquids as needed  16-24 oz 0 g Take Multivitamin with 18 mg ferrous sulfate Total:  64 oz fluids 63 
grams 2 Multivitamins with 18 mg ferrous sulfate, 7146-9359 mg calcium citrate Introducing [facility]! Dear Lizeth Valdivia: Thank you for requesting a [employer] account. Our records indicate that you already have an active [employer] account. You can access your account anytime at [email or web address] Did you know that you can access your hospital and ER discharge instructions at any time in [employer]? You can also review all of your test results from your hospital stay or ER visit. Additional Information If you have questions, please visit the Frequently Asked Questions section of the [employer] website at [email or web address] Remember, [employer] is NOT to be used for urgent needs. For medical emergencies, dial 911. Now available from your iPhone and Android! Please provide this summary of care documentation to your next provider. Your primary care clinician is listed as Phys Other. If you have any questions after today's visit, please call [phone].

## 2018-07-19 ENCOUNTER — TELEPHONE (OUTPATIENT)
Dept: SURGERY | Age: 53
End: 2018-07-19

## 2018-07-19 NOTE — TELEPHONE ENCOUNTER
----- Message from Sammy Nunn LPN sent at 1/83/4872  2:40 PM EDT -----  Regarding: FW: ED pt DC to home Thursday 6/28, 3 week post op call      ----- Message -----     From: JUSTINO Lehman     Sent: 6/28/2018   2:23 PM       To: Marceloanastasia Merced  Subject: ED pt DC to home Thursday 6/28                   Bariatric Discharge Notice for Follow Up Call    Admit Date: 6/27    Pt is POD #1 s/p laparoscopic repair of hiatal hernia with mesh, removal of mesh gastric band  surgery and is being discharged to home today Thurs 6/27 after a routine post op course.      Thanks,  Rosey Mederos PA-C

## 2018-07-19 NOTE — TELEPHONE ENCOUNTER
Bariatric Post-Operative Phone Calls: Week 3    Diet:Question of any nausea and/or vomiting. Question of tolerance to diet advancement from liquids to solids. Protein intake (goal is 60 grams of protein daily)   Poor____Fair____Good__x__Great____     Comment:______________________________________________________________      ______________________________________________________________________    Hydration:Less than 32 ounces of water daily is fair to poor (Goal is 64 ounces per day)   Poor____ Fair____ Good____Great__x__    Comment:______________________________________________________________    ______________________________________________________________________      Ambulation:( walking at least 3 x week, for at least 30 minutes)   Poor______ Fair______ Good______     Great__x____ Comment:__________________________________________________    ______________________________________________________________________      Urine Color: Question of any odor and color(should be rowdy, pale, and clear) Dark______ Amber______ Pale______      Clear__x____ Comment:___________________________________________________                           ________________________________________________________________    Bowel movements: Question of any constipation- haven't had any bowel movements for more than 3 days. This could be related to protein intake and/or narcotic pain medication usage. Comment:                                                                                                                       Regular bowel movements       Pain: Left sided abdominal pain is normal (should be less than 3)         Question if pain medication is helpful.  10___ 9___ 8___ 7___ 6___ 5___ 4___ 3___     2___1___0__x_Comment:_________________________________________________    ______________________________________________________________________      Incision: (No redness, pain, swelling or fever) Healing Well______ Healed___x___Redness_________ Pain_________     Swelling_________ Fever__________(greater than 101 needs evaluation)    Comment:____________________________________________________________    ______________________________________________________________________  Use of incentive spirometer: Yes____       No    x       Next Appointment:__8/15/18____________                 Support Group: Yes______No__x____    Additional Comments:____________________________________________________________    ____________________________________________________________________      If more than one parameter is not met or considered poor, nurse needs to discuss with provider recommend for patient to be seen in the office as soon as possible or refer to the provider for follow-up. Reinforce to patient to use bariatric educational booklet as guide. It is appropriate to refer patient to the nutritionist to discuss more in detail of diet and nutrition.

## 2018-07-27 NOTE — PROGRESS NOTES
Chief Complaint   Patient presents with    Surgical Follow-up     2 1/2 weeks s/p Laparoscopic excision of marlex mesh band to relieve obstruction, hiatal hernia repair with biologic mesh, Hill gastropexy lost 9 pounds       Beni Hernandez is 2+ weeks status post excision of marlex mesh band, hiatal hernia repair and gastropexy. She is feeling 99% better. she is no longer vomiting and has minimal reflux \"nothing like before\". She reports some things still \"go a little slow, but no longer just sit\". Patient is satisfied with progress. Patient is consuming about 55 grams of protein daily. She is tired of the milk based shakes    Patient is drinking 48 oz of fluids per day. Bowels moving most days     No fever or chills, chest pain or shortness of breath. Pain is minimal     Vitamin compliance yes  Activity  Walking     Physical Exam  Visit Vitals    /68    Pulse 65    Temp 98.1 °F (36.7 °C)    Resp 20    Ht 4' 11\" (1.499 m)    Wt 186 lb (84.4 kg)    SpO2 99%    BMI 37.57 kg/m2     A + O x 3, looks well   Chest  CTA, unlabored   COR  RRR  ABD Soft, lap sites are C. D.I, no erythema or induration, minimal tenderness, ND, no masses or hernias   EXT No edema; ambulating independently       ICD-10-CM ICD-9-CM    1. Follow-up examination following surgery Z09 V67.00    2. Postoperative intestinal malabsorption K91.2 579.3    3. Regurgitation R11.10 787.03    4. BMI 37.0-37.9, adult Z68.37 V85.37          Beni Hernandez is 2+ weeks  s/p excision of marlex band to relieve obstruction doing well   Diet soft texture stage I-II   Continue with protein supplements and suggested variety of clear supplements   Continue with bariatric vitamins   Activity walking and may swim in a pool   Follow-up in 2 weeks  Support group  Beni Hernandez verbalized understanding and questions were answered to the best of my knowledge and ability. diet educational materials were provided.     15 minutes spent in face to face with Stephany Killings > 50% counseling.

## 2018-08-15 ENCOUNTER — OFFICE VISIT (OUTPATIENT)
Dept: SURGERY | Age: 53
End: 2018-08-15

## 2018-08-15 VITALS
DIASTOLIC BLOOD PRESSURE: 70 MMHG | WEIGHT: 189.8 LBS | HEART RATE: 80 BPM | OXYGEN SATURATION: 97 % | TEMPERATURE: 99.5 F | BODY MASS INDEX: 38.26 KG/M2 | HEIGHT: 59 IN | RESPIRATION RATE: 18 BRPM | SYSTOLIC BLOOD PRESSURE: 100 MMHG

## 2018-08-15 DIAGNOSIS — K21.9 GASTROESOPHAGEAL REFLUX DISEASE WITHOUT ESOPHAGITIS: ICD-10-CM

## 2018-08-15 DIAGNOSIS — Z09 SURGICAL FOLLOW-UP CARE: Primary | ICD-10-CM

## 2018-08-15 DIAGNOSIS — Z98.84 STATUS POST GASTRIC BYPASS FOR OBESITY: ICD-10-CM

## 2018-08-15 NOTE — PROGRESS NOTES
Mario Alberto Be is a 45 y.o. female 7 weeks s/p Laparoscopic excision of marlex mesh band to relieve obstruction, hiatal hernia repair with biologic mesh, Hill gastropexy, down 7 pounds. Weight today is 190 pounds. Denies nausea, no vomiting, no heartburn/reflux. Denies dysphagia. No fever/no chills, no shortness of breath, no chest pain, and no abdominal pain. Tolerating all foods on last diet increase, getting 50 protein daily. Protein supplementation in use with protein shake for breakfast. Drinking at least  ounces of water daily. Tolerating all vitamins and medications. Exercising with walking. No issues with urination. Bowel movements once daily that are formed. HPI    Review of Systems   Constitutional: Negative for chills, fever and malaise/fatigue. Respiratory: Negative for cough, sputum production and shortness of breath. Cardiovascular: Negative for chest pain, palpitations and leg swelling. Gastrointestinal: Negative for abdominal pain, blood in stool, constipation, diarrhea, heartburn, nausea and vomiting. Genitourinary: Negative for dysuria. Neurological: Negative for dizziness. Physical Exam   Constitutional: She appears well-developed and well-nourished. No distress. Cardiovascular: Normal rate, regular rhythm and normal heart sounds. Pulmonary/Chest: Effort normal and breath sounds normal. No respiratory distress. She has no wheezes. She has no rales. Abdominal: Soft. Bowel sounds are normal. She exhibits no distension. There is no tenderness. There is no rebound and no guarding. Lap sites dry and intact   Musculoskeletal: Normal range of motion. She exhibits no edema. Skin: Skin is warm and dry. No rash noted. No erythema. Psychiatric: She has a normal mood and affect. Her behavior is normal. Thought content normal.   Blood pressure 100/70, pulse 80, temperature 99.5 °F (37.5 °C), temperature source Oral, resp.  rate 18, height 4' 11\" (1.499 m), weight 189 lb 12.8 oz (86.1 kg), SpO2 97 %. ASSESSMENT and PLAN  7 weeks s/p Laparoscopic excision of marlex mesh band to relieve obstruction, hiatal hernia repair with biologic mesh, Hill gastropexy     Patient may advance to \"regular diet\", that is outlined in educational booklet. Emphasis on 60 grams of protein daily. Pay attention to eating behaviors of no eating/drinking at the same time, chewing food wells, and portion control. Beware of foods that are too dry, may cause dysphagia. Continue hydration with at least 40 ounces of non-calorie, non-carbonated beverages daily. Continue vitamin regiment daily. For physical activity, patient is now free of restrictions and may incorporate more to exercise, yet increase as tolerated. Recommend attending support group. Follow up in 6 weeks. Patient verbalized understanding and questions were answered to the best of my knowledge and ability. Diet advancement educational materials were provided. Advised to call office with any questions or concerns.

## 2018-08-15 NOTE — PROGRESS NOTES
1. Have you been to the ER, urgent care clinic since your last visit? Hospitalized since your last visit? No    2. Have you seen or consulted any other health care providers outside of the [address] [place] [address] since your last visit? Include any pap smears or colon screening.  No

## 2018-08-15 NOTE — MR AVS SNAPSHOT
[address] [place] [id number] [phone] Patient: Sarika Hopper MRN: [id number] :1965 Visit Information Date & Time Provider Department Dept. Phone Encounter #  
 8/15/2018  9:20 AM Oseas Khan NP [facility] 22 060 [phone] [id number] Follow-up Instructions Return in about 6 weeks (around 2018). Upcoming Health Maintenance Date Due Hepatitis C Screening 1965 DTaP/Tdap/Td series (1 - Tdap) 10/17/1984 PAP AKA CERVICAL CYTOLOGY 10/17/1984 BREAST CANCER SCRN MAMMOGRAM 10/17/2013 FOBT Q 1 YEAR AGE 50-75 10/17/2013 Influenza Age 5 to Adult 2018 Allergies as of 8/15/2018  Review Complete On: 8/15/2018 By: Luis Enrique Ordaz LPN Severity Noted Reaction Type Reactions Aspirin  2012    Other (comments) Due to GBP surgery Other Medication  2012    Other (comments) Steroids, can't take due to Gastric Bypass surgery Current Immunizations  Never Reviewed No immunizations on file. Not reviewed this visit You Were Diagnosed With   
  
 Codes Comments Surgical follow-up care    -  Primary ICD-10-CM: W03 ICD-9-CM: V67.00 Gastroesophageal reflux disease without esophagitis     ICD-10-CM: K21.9 ICD-9-CM: 530.81 Status post gastric bypass for obesity     ICD-10-CM: Z98.84 ICD-9-CM: V45.86 BMI 38.0-38.9,adult     ICD-10-CM: F38.61 
ICD-9-CM: V85.38 Vitals BP Pulse Temp Resp Height(growth percentile) Weight(growth percentile) 100/70 80 99.5 °F (37.5 °C) (Oral) 18 4' 11\" (1.499 m) 189 lb 12.8 oz (86.1 kg) SpO2 BMI OB Status Smoking Status 97% 38.33 kg/m2 Postmenopausal Former Smoker BMI and BSA Data Body Mass Index Body Surface Area  
 38.33 kg/m 2 1.89 m 2 Preferred Pharmacy Pharmacy Name Phone [id number] Natanael [facility] [address] Your Updated Medication List  
  
   
This list is accurate as of 8/15/18 10:05 AM.  Always use your most recent med list.  
  
  
  
  
 CALCIUM + D PO Take 1 Tab by mouth daily. gabapentin 300 mg capsule Commonly known as:  NEURONTIN  
900 mg three (3) times daily. levothyroxine 100 mcg tablet Commonly known as:  SYNTHROID  
TAKE 1 TABLET BY ORAL ROUTE EVERY DAY MONDAY TO SATURDAY, 1.5 TABLET SUNDAY MULTI-VITAMIN PO Take  by mouth. omeprazole 40 mg capsule Commonly known as:  PRILOSEC Take 40 mg by mouth every morning. orphenadrine citrate 100 mg sr tablet Commonly known as:  NORFLEX  
two (2) times a day. PREMPRO 0.625-2.5 mg per tablet Generic drug:  estrogen (conjugated)-medroxyPROGESTERone 1 Tab daily. venlafaxine- mg capsule Commonly known as:  EFFEXOR-XR  
TAKE ONE CAPSULE BY MOUTH EVERY MORNING  
  
 VITAMIN B-12 1,000 mcg sublingual tablet Generic drug:  cyanocobalamin Take 1,000 mcg by mouth daily. VITAMIN D2 PO Take 2,000 Units by mouth two (2) times a day. Follow-up Instructions Return in about 6 weeks (around 9/26/2018). Patient Instructions What you need to know: 
1 . Advance your diet to solid textured foods. 2. Take the recommended vitamins daily 3. You may return to your normal lifting 4. You can jog, walk, run, use and elliptical.  
5 Follow up in 6 weeks. 6. You may go into a pool. 7. If you are not able to tolerate liquids, soft foods, moist meats or solid foods   Please call our office. 8. If you have vomiting and persistent epigastric pain or chest pain. You should call our office  [phone]) , the doctor on-call or go to the emergency room. What to do if you are constipated: You may  take Milk of Magnesia.  Take 2 Tablespoons followed by 16 oz of water then 2 hours later take another 2 tablespoons. If  milk of magnesia does not work then take [place] or Miralax over the counter. Keep in mind that the Benefiber or Miralax may take a day or two to work. If all of the above do not work try a Fleets enema and follow the directions on the box. Introducing [facility]! Dear Joseluis Dotson: Thank you for requesting a [facility] account. Our records indicate that you already have an active [facility] account. You can access your account anytime at [email or web address] Did you know that you can access your hospital and ER discharge instructions at any time in [facility]? You can also review all of your test results from your hospital stay or ER visit. Additional Information If you have questions, please visit the Frequently Asked Questions section of the [facility] website at [email or web address] Remember, [facility] is NOT to be used for urgent needs. For medical emergencies, dial 911. Now available from your iPhone and Android! Please provide this summary of care documentation to your next provider. Your primary care clinician is listed as [facility]. If you have any questions after today's visit, please call [phone].

## 2018-08-15 NOTE — PATIENT INSTRUCTIONS
What you need to know:  1 . Advance your diet to solid textured foods. 2. Take the recommended vitamins daily  3. You may return to your normal lifting   4. You can jog, walk, run, use and elliptical.   5 Follow up in 6 weeks. 6. You may go into a pool. 7. If you are not able to tolerate liquids, soft foods, moist meats or solid foods   Please call our office. 8. If you have vomiting and persistent epigastric pain or chest pain. You should call our office  [phone]) , the doctor on-call or go to the emergency room. What to do if you are constipated: You may  take Milk of Magnesia. Take 2 Tablespoons followed by 16 oz of water then 2 hours later take another 2 tablespoons. If  milk of magnesia does not work then take [place] or Miralax over the counter. Keep in mind that the Benefiber or Miralax may take a day or two to work. If all of the above do not work try a Fleets enema and follow the directions on the box.

## 2018-10-02 ENCOUNTER — TELEPHONE (OUTPATIENT)
Dept: SURGERY | Age: 53
End: 2018-10-02

## 2019-04-30 ENCOUNTER — TELEPHONE (OUTPATIENT)
Dept: SURGERY | Age: 54
End: 2019-04-30

## 2020-01-16 NOTE — PROGRESS NOTES
Discussed with patient by phone. Labs in appropriate range except Vitamin D mildly low. Advised patient to continue with current bariatric vitamin regiment. Also add Vitamin D3- 5,000 units daily over the counter. No more medication refills.  If he wishes to re-establish care with me if he returns to [place], he should call for appointment.

## 2020-03-03 ENCOUNTER — OFFICE VISIT (OUTPATIENT)
Dept: SURGERY | Age: 55
End: 2020-03-03

## 2020-03-03 VITALS
HEIGHT: 59 IN | SYSTOLIC BLOOD PRESSURE: 133 MMHG | TEMPERATURE: 98.5 F | OXYGEN SATURATION: 95 % | RESPIRATION RATE: 16 BRPM | BODY MASS INDEX: 35.88 KG/M2 | DIASTOLIC BLOOD PRESSURE: 80 MMHG | WEIGHT: 178 LBS | HEART RATE: 96 BPM

## 2020-03-03 DIAGNOSIS — K91.2 POSTOPERATIVE INTESTINAL MALABSORPTION: ICD-10-CM

## 2020-03-03 DIAGNOSIS — E53.8 B12 DEFICIENCY: ICD-10-CM

## 2020-03-03 DIAGNOSIS — F50.89 PICA: ICD-10-CM

## 2020-03-03 DIAGNOSIS — K91.2 POSTOPERATIVE INTESTINAL MALABSORPTION: Primary | ICD-10-CM

## 2020-03-03 DIAGNOSIS — E55.9 VITAMIN D DEFICIENCY: ICD-10-CM

## 2020-03-03 DIAGNOSIS — E61.1 IRON DEFICIENCY: ICD-10-CM

## 2020-03-03 DIAGNOSIS — E07.9 THYROID DISEASE: ICD-10-CM

## 2020-03-03 NOTE — PROGRESS NOTES
Chief Complaint   Patient presents with    Surgical Follow-up     1 yr 9 months s/p Laparoscopic excision of marlex mesh band to relieve obstruction, hiatal hernia repair with biologic mesh, Hill gastropexy, down 19lbs lost 12lbs       Yamel Honeycutt is 18 months status post excision Marlex mesh, hiatal hernia repair and gastropexy. Presents today for obesity management. Patient has lost 19 lbs. Since surgery. Patient is satisfied with progress. Wants to lose another 10 lbs to get \"back to stable weight\"   She is stressed currently as her fiance has metastatic prostate cancer. Has only few months to live. She is sad but taking things   \"one day at a time\"  Patient is consuming about 55-65 grams of protein daily. Tolerating \"real food\"   Can tolerate moist meats and produce   Not snacking like she was   Patient is drinking 48 oz of fluids per day. Bowels moving most days   Nausea  no  Regurgitation  no  Ice PICA   Vitamin compliance Yes, but taking MVI > 50 so no iron   Activity  Walking some   Sleep   Not great due to stress, but Ambien helps   Thyroid has been off and has had med adjustment   Due for labs end of the month   Has occasional tenderness or feeling like \"something stuck\" under right rib cage  Usually with sitting and will stand and stretch and resolves    Physical Exam  Visit Vitals  /80 (BP 1 Location: Left arm, BP Patient Position: Sitting)   Pulse 96   Temp 98.5 °F (36.9 °C) (Oral)   Resp 16   Ht 4' 11\" (1.499 m)   Wt 178 lb (80.7 kg)   SpO2 95%   BMI 35.95 kg/m²     A + O x 3  Chest  CTA, unlabored   COR  RRR  ABD Soft, NT/ND, no hernias  EXT No edema; ambulating independently       ICD-10-CM ICD-9-CM    1. Postoperative intestinal malabsorption K91.2 579.3 CBC W/O DIFF      VITAMIN B12 & FOLATE      VITAMIN D, 25 HYDROXY      IRON PROFILE      METABOLIC PANEL, COMPREHENSIVE   2.  BMI 35.0-35.9,adult Z68.35 V85.35 CBC W/O DIFF      VITAMIN B12 & FOLATE      VITAMIN D, 25 HYDROXY IRON PROFILE      METABOLIC PANEL, COMPREHENSIVE   3. Pica F50.89 307.52 CBC W/O DIFF      VITAMIN B12 & FOLATE      VITAMIN D, 25 HYDROXY      IRON PROFILE      METABOLIC PANEL, COMPREHENSIVE   4. Iron deficiency E61.1 280.9 CBC W/O DIFF      VITAMIN B12 & FOLATE      VITAMIN D, 25 HYDROXY      IRON PROFILE      METABOLIC PANEL, COMPREHENSIVE   5. B12 deficiency E53.8 266.2 CBC W/O DIFF      VITAMIN B12 & FOLATE      VITAMIN D, 25 HYDROXY      IRON PROFILE      METABOLIC PANEL, COMPREHENSIVE   6. Vitamin D deficiency E55.9 268.9 VITAMIN D, 25 HYDROXY   7. Thyroid disease E07.9 246.9 TSH AND FREE T4       Maribel Soler is 18 months  s/p excision marlex mesh doing well   Diet protein and produce   Self care   Continue vitamins and protein supplements/switch to MVI with iron and will get labs, added THS and T4 so she will have when she sees pcp   Activity daily walking 10 minutes every hour   Sleep hygiene reviewed   Follow-up in 1 year   Reassured no hernia    Support group  Maribel Soler verbalized understanding and questions were answered to the best of my knowledge and ability. Diet, activity and mindfulness educational materials were provided. 18 minutes spent in face to face with Maribel Soler > 50% counseling.

## 2020-03-03 NOTE — PROGRESS NOTES
1. Have you been to the ER, urgent care clinic since your last visit? Hospitalized since your last visit? No    2. Have you seen or consulted any other health care providers outside of the [address] [place] [address] since your last visit? Include any pap smears or colon screening.  No

## 2020-03-03 NOTE — PATIENT INSTRUCTIONS
Get your labs done in about 2 weeks so you will have results when you meet with your PCP I'll follow up with you on vitamins For now switch to a Women's One a Day WITH iron (no over 50 vitamins) Stay on your other vitamins

## 2020-12-22 ENCOUNTER — TELEPHONE (OUTPATIENT)
Dept: SURGERY | Age: 55
End: 2020-12-22

## 2021-05-17 ENCOUNTER — TELEPHONE (OUTPATIENT)
Dept: SURGERY | Age: 56
End: 2021-05-17

## 2021-05-26 NOTE — PROGRESS NOTES
[address] [facility] at [facility]  Supervised Weight Loss     Date:   2018    Patient's Name: Barry Eisenmenger  : 1965    Insurance:  [facility]          Session:   Surgery: Gastric Bypass Revision  Surgeon:  Agnieszka Marcus M.D. Height: 60\"   Weight:    193      Lbs. BMI: 38   Pounds Lost since last month: 1#               Pounds Gained since last month: 0    Starting Weight: 194#   Previous Months Weight: 194#  Overall Pounds Lost: 1#  Overall Pounds Gained: 0    Other Pertinent Information: n/a     Smoking Status:  none  Alcohol Intake: none    I have reviewed with patient the guidelines of the supervised weight loss class. Patient understands the expectations of some weight loss during the weight loss trial.  Patient understands that weight gain could delay the process. I have also expressed to patient that classes need to be consecutive. Missing a class may subject patient to have to start their trial over. Patient has received this information in writing. Changes that patient has made since last month include:  Drinking a protein shake in place of skipping dinner, eating more protein, not adding crackers to soup. Eating Habits and Behaviors  Today we reviewed the general diet principles for weight loss surgery. Their plate should be made up of 1/2 coming from non-starchy vegetables, 1/4 coming from lean meat, and 1/4 of their plate coming from carbohydrates, including fruits, starches, or milk. Emphasis was placed on the importance of eating 3 meals a day and aiming for 60 grams of protein per day. I educated the patient on limiting liquid calories and drinking only calorie-free, sugar-free and non-carbonated beverages. We discussed the importance of drinking 64 ounces of fluid per day to prevent dehydration post-operatively.  A nutrition education lesson regarding vitamin and mineral supplements was provided and the importance of preventing nutrient Symbicort      Last Written Prescription Date:  3.18.21  Last Fill Quantity: 10.2,   # refills: 0      Spiriva      Last Written Prescription Date:  3.18.21  Last Fill Quantity: 4g,   # refills: 0  Last Office Visit: 5.19.21           deficiencies post-operatively. Patient's current diet habits include: eating 3 meals a day. Was previously skipping dinner and now working on drinking a protein shake in place of skipping that meal. Denies any snacking between meals. Avoiding refined carbohydrates, sweets, desserts. Eating a mixture of baked, grilled, broiled foods. Eating out is once a week (salad with grilled chicken). Drinking 32 oz water, 24 oz unsweetened tea, 24 oz coffee daily. Denies emotional and situational eating. Packing meals to take to work. Eating most meals at a table and takes 20 minutes to finish the meal. Reports lack of activity is biggest barrier to weight loss. Physical Activity/Exercise  During class we discussed the importance of increasing daily physical activity and beginning to develop an exercise regimen/routine. We discussed that exercise is an important part of long term weight loss. Comments:  During class, I discussed with patient the importance of getting into an exercise routine. Patient is currently walking 5 times per week for activity. Patient has been encouraged to maintain and increase as tolerated. Behavior Modification       Comments: We discussed the importance of eating mindfully after weight loss surgery to prevent food intolerance and prevent weight regain. We talked about how to eat more mindfully and identify emotional eating triggers. Tips and recommendations for how to make these changes were provided. Patient was encouraged to keep a food journal and record what they were taking in daily. Overall Assessment: Patient demonstrates appropriate lifestyle changes evidenced by reported changes and weight loss/maintenance. Will continue to assess as pt works to complete supervised weight loss requirements. Patient-Set Goals:   1. Nutrition - continue to drink protein shake for dinner  2. Exercise - increase exercise to 1 hour a day  3.  Behavior -continue to eat more protein    [facility] Patient, RD  1/18/2018

## 2022-03-19 PROBLEM — K21.9 GERD (GASTROESOPHAGEAL REFLUX DISEASE): Status: ACTIVE | Noted: 2018-06-27

## 2022-03-20 PROBLEM — E03.9 ACQUIRED HYPOTHYROIDISM: Status: ACTIVE | Noted: 2017-08-18

## (undated) DEVICE — BLADELESS OPTICAL TROCAR WITH FIXATION CANNULA: Brand: VERSAONE

## (undated) DEVICE — UNIVERSAL FIXATION CANNULA: Brand: VERSAONE

## (undated) DEVICE — CATH IV AUTOGRD BC BLU 22GA 25 -- INSYTE

## (undated) DEVICE — 39" SINGLE PATIENT USE HOVERMATT BREATHABLE: Brand: SINGLE PATIENT USE HOVERMATT

## (undated) DEVICE — SYRINGE MED 20ML STD CLR PLAS LUERLOCK TIP N CTRL DISP

## (undated) DEVICE — SET EXTN TBNG L BOR 4 W STPCOCK ST 32IN PRIMING VOL 6ML

## (undated) DEVICE — KIT IV STRT W CHLORAPREP PD 1ML

## (undated) DEVICE — 1200 GUARD II KIT W/5MM TUBE W/O VAC TUBE: Brand: GUARDIAN

## (undated) DEVICE — BASIN EMSIS 16OZ GRAPHITE PLAS KID SHP MOLD GRAD FOR ORAL

## (undated) DEVICE — SET ADMIN 16ML TBNG L100IN 2 Y INJ SITE IV PIGGY BK DISP

## (undated) DEVICE — BW-412T DISP COMBO CLEANING BRUSH: Brand: SINGLE USE COMBINATION CLEANING BRUSH

## (undated) DEVICE — AGENT HEMSTAT 3GM PURIFIED PLNT STARCH PWD ABSRB ARISTA AH

## (undated) DEVICE — MEDI-VAC NON-CONDUCTIVE SUCTION TUBING: Brand: CARDINAL HEALTH

## (undated) DEVICE — Device

## (undated) DEVICE — SUTURE VCRL SZ 3-0 L27IN ABSRB UD L26MM SH 1/2 CIR J416H

## (undated) DEVICE — ENDO CARRY-ON PROCEDURE KIT INCLUDES ENZYMATIC SPONGE, GAUZE, BIOHAZARD LABEL, TRAY, LUBRICANT, DIRTY SCOPE LABEL, WATER LABEL, TRAY, DRAWSTRING PAD, AND DEFENDO 4-PIECE KIT.: Brand: ENDO CARRY-ON PROCEDURE KIT

## (undated) DEVICE — APPLICATOR BNDG 1MM ADH PREMIERPRO EXOFIN

## (undated) DEVICE — DEVICE SUT 0 L48IN GRN POLY BRAID LD UNIT DISP SURGDAC

## (undated) DEVICE — SURGICAL PROCEDURE KIT GEN LAPAROSCOPY LF

## (undated) DEVICE — TUBING INSUFLTN 10FT LUER -- CONVERT TO ITEM 368568

## (undated) DEVICE — (D)PREP SKN CHLRAPRP APPL 26ML -- CONVERT TO ITEM 371833

## (undated) DEVICE — STERILE POLYISOPRENE POWDER-FREE SURGICAL GLOVES WITH EMOLLIENT COATING: Brand: PROTEXIS

## (undated) DEVICE — VISUALIZATION SYSTEM: Brand: CLEARIFY

## (undated) DEVICE — NEEDLE HYPO 22GA L1.5IN BLK S STL HUB POLYPR SHLD REG BVL

## (undated) DEVICE — FILTER SMK EVAC FLO CLR MEGADYNE

## (undated) DEVICE — BLADELESS OPTICAL TROCAR WITH FIXATION CANNULA: Brand: VERSAPORT

## (undated) DEVICE — KENDALL SCD EXPRESS SLEEVES, KNEE LENGTH, MEDIUM: Brand: KENDALL SCD

## (undated) DEVICE — SOLUTION IV 1000ML 0.9% SOD CHL

## (undated) DEVICE — GOWN,SIRUS,FABRNF,XL,20/CS: Brand: MEDLINE

## (undated) DEVICE — KENDALL RADIOLUCENT FOAM MONITORING ELECTRODE -RECTANGULAR SHAPE: Brand: KENDALL

## (undated) DEVICE — CLICKLINE SCISSORS INSERT: Brand: CLICKLINE

## (undated) DEVICE — SHEARS ENDOSCP L36CM DIA5MM ULTRASONIC CRV TIP HARM

## (undated) DEVICE — UNIVERSAL FIXATION CANNULA: Brand: VERSAPORT

## (undated) DEVICE — NEEDLE HYPO 18GA L1.5IN PNK S STL HUB POLYPR SHLD REG BVL

## (undated) DEVICE — SYRINGE 50ML E/T

## (undated) DEVICE — Z INACTIVE USE 2240337 DRAPE SURG PT TRANSFER TRAWAY SHT

## (undated) DEVICE — SUTURE MCRYL SZ 4-0 L27IN ABSRB UD L19MM PS-2 1/2 CIR PRIM Y426H

## (undated) DEVICE — REM POLYHESIVE ADULT PATIENT RETURN ELECTRODE: Brand: VALLEYLAB

## (undated) DEVICE — SOLIDIFIER FLUID 3000 CC ABSORB

## (undated) DEVICE — APPLICATOR SURG XL L38CM FOR ARISTA ABSRB HEMSTAT FLEXITIP

## (undated) DEVICE — CANN NASAL O2 CAPNOGRAPHY AD -- FILTERLINE

## (undated) DEVICE — DEVON™ KNEE AND BODY STRAP 60" X 3" (1.5 M X 7.6 CM): Brand: DEVON

## (undated) DEVICE — INFECTION CONTROL KIT SYS

## (undated) DEVICE — INSUFFLATION NEEDLE: Brand: SURGINEEDLE

## (undated) DEVICE — BAG BELONG PT PERS CLEAR HANDL

## (undated) DEVICE — DISSECTOR ULTRASONIC L48CM CRDLSS W/ TORQ WRNCH SONICISION

## (undated) DEVICE — SUTURING DEVICE: Brand: ENDO STITCH